# Patient Record
Sex: FEMALE | Race: WHITE | Employment: OTHER | URBAN - METROPOLITAN AREA
[De-identification: names, ages, dates, MRNs, and addresses within clinical notes are randomized per-mention and may not be internally consistent; named-entity substitution may affect disease eponyms.]

---

## 2017-12-11 ENCOUNTER — HOSPITAL ENCOUNTER (OUTPATIENT)
Dept: RADIOLOGY | Facility: MEDICAL CENTER | Age: 72
End: 2017-12-11
Attending: OBSTETRICS & GYNECOLOGY
Payer: MEDICARE

## 2017-12-11 DIAGNOSIS — Z12.31 ENCOUNTER FOR SCREENING MAMMOGRAM FOR MALIGNANT NEOPLASM OF BREAST: ICD-10-CM

## 2017-12-11 PROCEDURE — G0202 SCR MAMMO BI INCL CAD: HCPCS

## 2017-12-20 ENCOUNTER — RESOLUTE PROFESSIONAL BILLING HOSPITAL PROF FEE (OUTPATIENT)
Dept: MEDSURG UNIT | Facility: MEDICAL CENTER | Age: 72
End: 2017-12-20
Payer: MEDICARE

## 2017-12-20 ENCOUNTER — APPOINTMENT (OUTPATIENT)
Dept: RADIOLOGY | Facility: MEDICAL CENTER | Age: 72
DRG: 841 | End: 2017-12-20
Attending: EMERGENCY MEDICINE
Payer: MEDICARE

## 2017-12-20 ENCOUNTER — HOSPITAL ENCOUNTER (INPATIENT)
Facility: MEDICAL CENTER | Age: 72
LOS: 7 days | DRG: 841 | End: 2017-12-27
Attending: EMERGENCY MEDICINE | Admitting: INTERNAL MEDICINE
Payer: MEDICARE

## 2017-12-20 DIAGNOSIS — D69.6 THROMBOCYTOPENIA (HCC): ICD-10-CM

## 2017-12-20 DIAGNOSIS — R06.09 DOE (DYSPNEA ON EXERTION): ICD-10-CM

## 2017-12-20 DIAGNOSIS — D72.820 LYMPHOCYTOSIS: ICD-10-CM

## 2017-12-20 DIAGNOSIS — R00.0 TACHYCARDIA: ICD-10-CM

## 2017-12-20 DIAGNOSIS — R07.89 OTHER CHEST PAIN: ICD-10-CM

## 2017-12-20 DIAGNOSIS — D72.829 LEUKOCYTOSIS, UNSPECIFIED TYPE: ICD-10-CM

## 2017-12-20 DIAGNOSIS — D64.9 ANEMIA, UNSPECIFIED TYPE: ICD-10-CM

## 2017-12-20 LAB
ABO GROUP BLD: NORMAL
ABO GROUP BLD: NORMAL
ALBUMIN SERPL BCP-MCNC: 4.2 G/DL (ref 3.2–4.9)
ALBUMIN/GLOB SERPL: 1.6 G/DL
ALP SERPL-CCNC: 80 U/L (ref 30–99)
ALT SERPL-CCNC: 13 U/L (ref 2–50)
ANION GAP SERPL CALC-SCNC: 10 MMOL/L (ref 0–11.9)
ANISOCYTOSIS BLD QL SMEAR: ABNORMAL
APPEARANCE UR: CLEAR
APTT PPP: 22.6 SEC (ref 24.7–36)
AST SERPL-CCNC: 20 U/L (ref 12–45)
BACTERIA #/AREA URNS HPF: NEGATIVE /HPF
BARCODED ABORH UBTYP: 7300
BARCODED ABORH UBTYP: 7300
BARCODED PRD CODE UBPRD: NORMAL
BARCODED PRD CODE UBPRD: NORMAL
BARCODED UNIT NUM UBUNT: NORMAL
BARCODED UNIT NUM UBUNT: NORMAL
BASOPHILS # BLD AUTO: 0 % (ref 0–1.8)
BASOPHILS # BLD: 0 K/UL (ref 0–0.12)
BILIRUB SERPL-MCNC: 1.1 MG/DL (ref 0.1–1.5)
BILIRUB UR QL STRIP.AUTO: NEGATIVE
BLD GP AB SCN SERPL QL: NORMAL
BNP SERPL-MCNC: 87 PG/ML (ref 0–100)
BUN SERPL-MCNC: 12 MG/DL (ref 8–22)
CALCIUM SERPL-MCNC: 9.3 MG/DL (ref 8.5–10.5)
CHLORIDE SERPL-SCNC: 105 MMOL/L (ref 96–112)
CO2 SERPL-SCNC: 23 MMOL/L (ref 20–33)
COLOR UR: YELLOW
COMPONENT R 8504R: NORMAL
CREAT SERPL-MCNC: 0.7 MG/DL (ref 0.5–1.4)
CULTURE IF INDICATED INDCX: YES UA CULTURE
EOSINOPHIL # BLD AUTO: 0 K/UL (ref 0–0.51)
EOSINOPHIL NFR BLD: 0 % (ref 0–6.9)
EPI CELLS #/AREA URNS HPF: ABNORMAL /HPF
ERYTHROCYTE [DISTWIDTH] IN BLOOD BY AUTOMATED COUNT: 86.6 FL (ref 35.9–50)
FLUAV RNA SPEC QL NAA+PROBE: NEGATIVE
FLUBV RNA SPEC QL NAA+PROBE: NEGATIVE
GFR SERPL CREATININE-BSD FRML MDRD: >60 ML/MIN/1.73 M 2
GLOBULIN SER CALC-MCNC: 2.6 G/DL (ref 1.9–3.5)
GLUCOSE SERPL-MCNC: 101 MG/DL (ref 65–99)
GLUCOSE UR STRIP.AUTO-MCNC: NEGATIVE MG/DL
HCT VFR BLD AUTO: 21.1 % (ref 37–47)
HGB BLD-MCNC: 6.3 G/DL (ref 12–16)
HYALINE CASTS #/AREA URNS LPF: ABNORMAL /LPF
INR PPP: 1.06 (ref 0.87–1.13)
KETONES UR STRIP.AUTO-MCNC: NEGATIVE MG/DL
LACTATE BLD-SCNC: 1.7 MMOL/L (ref 0.5–2)
LEUKOCYTE ESTERASE UR QL STRIP.AUTO: ABNORMAL
LIPASE SERPL-CCNC: 22 U/L (ref 11–82)
LYMPHOCYTES # BLD AUTO: 33.22 K/UL (ref 1–4.8)
LYMPHOCYTES NFR BLD: 73 % (ref 22–41)
MACROCYTES BLD QL SMEAR: ABNORMAL
MANUAL DIFF BLD: NORMAL
MCH RBC QN AUTO: 36.2 PG (ref 27–33)
MCHC RBC AUTO-ENTMCNC: 29.9 G/DL (ref 33.6–35)
MCV RBC AUTO: 121.3 FL (ref 81.4–97.8)
METAMYELOCYTES NFR BLD MANUAL: 0.9 %
MICRO URNS: ABNORMAL
MONOCYTES # BLD AUTO: 1.59 K/UL (ref 0–0.85)
MONOCYTES NFR BLD AUTO: 3.5 % (ref 0–13.4)
MORPHOLOGY BLD-IMP: NORMAL
MYELOCYTES NFR BLD MANUAL: 4.3 %
NEUTROPHILS # BLD AUTO: 7.92 K/UL (ref 2–7.15)
NEUTROPHILS NFR BLD: 13 % (ref 44–72)
NEUTS BAND NFR BLD MANUAL: 4.4 % (ref 0–10)
NITRITE UR QL STRIP.AUTO: NEGATIVE
NRBC # BLD AUTO: 0.36 K/UL
NRBC BLD-RTO: 0.8 /100 WBC
PH UR STRIP.AUTO: 5.5 [PH]
PLATELET # BLD AUTO: 22 K/UL (ref 164–446)
PLATELET BLD QL SMEAR: NORMAL
PLATELETS.RETICULATED NFR BLD AUTO: 12.1 K/UL (ref 0.6–13.1)
PMV BLD AUTO: 11.1 FL (ref 9–12.9)
POIKILOCYTOSIS BLD QL SMEAR: NORMAL
POTASSIUM SERPL-SCNC: 4.1 MMOL/L (ref 3.6–5.5)
PRODUCT TYPE UPROD: NORMAL
PRODUCT TYPE UPROD: NORMAL
PROMYELOCYTES NFR BLD MANUAL: 0.9 %
PROT SERPL-MCNC: 6.8 G/DL (ref 6–8.2)
PROT UR QL STRIP: NEGATIVE MG/DL
PROTHROMBIN TIME: 13.5 SEC (ref 12–14.6)
RBC # BLD AUTO: 1.74 M/UL (ref 4.2–5.4)
RBC # URNS HPF: ABNORMAL /HPF
RBC BLD AUTO: PRESENT
RBC UR QL AUTO: NEGATIVE
RH BLD: NORMAL
SODIUM SERPL-SCNC: 138 MMOL/L (ref 135–145)
SP GR UR STRIP.AUTO: 1.01
STOMATOCYTES BLD QL SMEAR: NORMAL
TROPONIN I SERPL-MCNC: <0.01 NG/ML (ref 0–0.04)
UNIT STATUS USTAT: NORMAL
UNIT STATUS USTAT: NORMAL
UROBILINOGEN UR STRIP.AUTO-MCNC: 0.2 MG/DL
VARIANT LYMPHS BLD QL SMEAR: NORMAL
WBC # BLD AUTO: 45.5 K/UL (ref 4.8–10.8)
WBC #/AREA URNS HPF: ABNORMAL /HPF

## 2017-12-20 PROCEDURE — 770020 HCHG ROOM/CARE - TELE (206)

## 2017-12-20 PROCEDURE — 85730 THROMBOPLASTIN TIME PARTIAL: CPT

## 2017-12-20 PROCEDURE — 86923 COMPATIBILITY TEST ELECTRIC: CPT

## 2017-12-20 PROCEDURE — 85007 BL SMEAR W/DIFF WBC COUNT: CPT

## 2017-12-20 PROCEDURE — P9016 RBC LEUKOCYTES REDUCED: HCPCS

## 2017-12-20 PROCEDURE — 99285 EMERGENCY DEPT VISIT HI MDM: CPT

## 2017-12-20 PROCEDURE — 700105 HCHG RX REV CODE 258: Performed by: STUDENT IN AN ORGANIZED HEALTH CARE EDUCATION/TRAINING PROGRAM

## 2017-12-20 PROCEDURE — 87040 BLOOD CULTURE FOR BACTERIA: CPT

## 2017-12-20 PROCEDURE — 87502 INFLUENZA DNA AMP PROBE: CPT

## 2017-12-20 PROCEDURE — 36430 TRANSFUSION BLD/BLD COMPNT: CPT

## 2017-12-20 PROCEDURE — 93005 ELECTROCARDIOGRAM TRACING: CPT | Performed by: EMERGENCY MEDICINE

## 2017-12-20 PROCEDURE — 93005 ELECTROCARDIOGRAM TRACING: CPT

## 2017-12-20 PROCEDURE — 83690 ASSAY OF LIPASE: CPT

## 2017-12-20 PROCEDURE — 71010 DX-CHEST-LIMITED (1 VIEW): CPT

## 2017-12-20 PROCEDURE — 86900 BLOOD TYPING SEROLOGIC ABO: CPT

## 2017-12-20 PROCEDURE — 87086 URINE CULTURE/COLONY COUNT: CPT

## 2017-12-20 PROCEDURE — 83880 ASSAY OF NATRIURETIC PEPTIDE: CPT

## 2017-12-20 PROCEDURE — 700105 HCHG RX REV CODE 258: Performed by: EMERGENCY MEDICINE

## 2017-12-20 PROCEDURE — 81001 URINALYSIS AUTO W/SCOPE: CPT

## 2017-12-20 PROCEDURE — 80053 COMPREHEN METABOLIC PANEL: CPT

## 2017-12-20 PROCEDURE — 85027 COMPLETE CBC AUTOMATED: CPT

## 2017-12-20 PROCEDURE — 83605 ASSAY OF LACTIC ACID: CPT

## 2017-12-20 PROCEDURE — 84484 ASSAY OF TROPONIN QUANT: CPT

## 2017-12-20 PROCEDURE — 85610 PROTHROMBIN TIME: CPT

## 2017-12-20 PROCEDURE — 85055 RETICULATED PLATELET ASSAY: CPT

## 2017-12-20 PROCEDURE — 86901 BLOOD TYPING SEROLOGIC RH(D): CPT

## 2017-12-20 PROCEDURE — 30233N1 TRANSFUSION OF NONAUTOLOGOUS RED BLOOD CELLS INTO PERIPHERAL VEIN, PERCUTANEOUS APPROACH: ICD-10-PCS | Performed by: EMERGENCY MEDICINE

## 2017-12-20 PROCEDURE — 86850 RBC ANTIBODY SCREEN: CPT

## 2017-12-20 RX ORDER — ACETAMINOPHEN 325 MG/1
650 TABLET ORAL EVERY 6 HOURS PRN
Status: DISCONTINUED | OUTPATIENT
Start: 2017-12-20 | End: 2017-12-27 | Stop reason: HOSPADM

## 2017-12-20 RX ORDER — GUAIFENESIN/DEXTROMETHORPHAN 100-10MG/5
10 SYRUP ORAL EVERY 6 HOURS PRN
Status: DISCONTINUED | OUTPATIENT
Start: 2017-12-20 | End: 2017-12-27 | Stop reason: HOSPADM

## 2017-12-20 RX ORDER — SODIUM CHLORIDE 9 MG/ML
INJECTION, SOLUTION INTRAVENOUS CONTINUOUS
Status: DISCONTINUED | OUTPATIENT
Start: 2017-12-20 | End: 2017-12-21

## 2017-12-20 RX ORDER — POLYETHYLENE GLYCOL 3350 17 G/17G
1 POWDER, FOR SOLUTION ORAL
Status: DISCONTINUED | OUTPATIENT
Start: 2017-12-20 | End: 2017-12-27 | Stop reason: HOSPADM

## 2017-12-20 RX ORDER — SODIUM CHLORIDE 9 MG/ML
INJECTION, SOLUTION INTRAVENOUS CONTINUOUS
Status: DISCONTINUED | OUTPATIENT
Start: 2017-12-20 | End: 2017-12-22

## 2017-12-20 RX ORDER — BISACODYL 10 MG
10 SUPPOSITORY, RECTAL RECTAL
Status: DISCONTINUED | OUTPATIENT
Start: 2017-12-20 | End: 2017-12-27 | Stop reason: HOSPADM

## 2017-12-20 RX ORDER — AMOXICILLIN 250 MG
2 CAPSULE ORAL 2 TIMES DAILY
Status: DISCONTINUED | OUTPATIENT
Start: 2017-12-20 | End: 2017-12-27 | Stop reason: HOSPADM

## 2017-12-20 RX ADMIN — SODIUM CHLORIDE 1000 ML: 9 INJECTION, SOLUTION INTRAVENOUS at 21:37

## 2017-12-20 RX ADMIN — SODIUM CHLORIDE: 9 INJECTION, SOLUTION INTRAVENOUS at 23:09

## 2017-12-20 ASSESSMENT — COPD QUESTIONNAIRES
DURING THE PAST 4 WEEKS HOW MUCH DID YOU FEEL SHORT OF BREATH: SOME OF THE TIME
HAVE YOU SMOKED AT LEAST 100 CIGARETTES IN YOUR ENTIRE LIFE: NO/DON'T KNOW
COPD SCREENING SCORE: 5
DO YOU EVER COUGH UP ANY MUCUS OR PHLEGM?: YES, EVERY DAY

## 2017-12-20 ASSESSMENT — ENCOUNTER SYMPTOMS
FALLS: 0
FLANK PAIN: 0
BLURRED VISION: 0
WEAKNESS: 1
VOMITING: 0
SHORTNESS OF BREATH: 1
ORTHOPNEA: 0
CONSTIPATION: 0
COUGH: 1
PHOTOPHOBIA: 0
FEVER: 0
NAUSEA: 0
DEPRESSION: 0
DOUBLE VISION: 0
DIZZINESS: 0
DIARRHEA: 0
SORE THROAT: 0
BLOOD IN STOOL: 0
PND: 0
SEIZURES: 0
HEMOPTYSIS: 0
CHILLS: 0
ABDOMINAL PAIN: 0
HEADACHES: 0
LOSS OF CONSCIOUSNESS: 0

## 2017-12-20 ASSESSMENT — PAIN SCALES - GENERAL
PAINLEVEL_OUTOF10: 0

## 2017-12-20 ASSESSMENT — LIFESTYLE VARIABLES
EVER_SMOKED: NEVER
EVER_SMOKED: NEVER
DO YOU DRINK ALCOHOL: NO
ALCOHOL_USE: NO

## 2017-12-20 ASSESSMENT — PATIENT HEALTH QUESTIONNAIRE - PHQ9
SUM OF ALL RESPONSES TO PHQ QUESTIONS 1-9: 0
2. FEELING DOWN, DEPRESSED, IRRITABLE, OR HOPELESS: NOT AT ALL
1. LITTLE INTEREST OR PLEASURE IN DOING THINGS: NOT AT ALL
SUM OF ALL RESPONSES TO PHQ9 QUESTIONS 1 AND 2: 0

## 2017-12-20 NOTE — ED NOTES
.  Chief Complaint   Patient presents with   • Sent by MD     cardiology   • Chest Pain     chest tightness 1 month, constant rapid hr   • Shortness of Breath     with exertion   pt ambulated to triage with s/o. Sent by pcp. Pt reports dx with unstable angina. ekg complete prior to triage.   Pt sob and rapid HR with exertion.

## 2017-12-21 ENCOUNTER — APPOINTMENT (OUTPATIENT)
Dept: RADIOLOGY | Facility: MEDICAL CENTER | Age: 72
DRG: 841 | End: 2017-12-21
Attending: STUDENT IN AN ORGANIZED HEALTH CARE EDUCATION/TRAINING PROGRAM
Payer: MEDICARE

## 2017-12-21 LAB
ALBUMIN SERPL BCP-MCNC: 3.4 G/DL (ref 3.2–4.9)
ALBUMIN/GLOB SERPL: 1.7 G/DL
ALP SERPL-CCNC: 62 U/L (ref 30–99)
ALT SERPL-CCNC: 10 U/L (ref 2–50)
ANION GAP SERPL CALC-SCNC: 7 MMOL/L (ref 0–11.9)
ANISOCYTOSIS BLD QL SMEAR: ABNORMAL
ANISOCYTOSIS BLD QL SMEAR: ABNORMAL
AST SERPL-CCNC: 16 U/L (ref 12–45)
BASOPHILS # BLD AUTO: 0 % (ref 0–1.8)
BASOPHILS # BLD AUTO: 0 % (ref 0–1.8)
BASOPHILS # BLD: 0 K/UL (ref 0–0.12)
BASOPHILS # BLD: 0 K/UL (ref 0–0.12)
BILIRUB SERPL-MCNC: 1.8 MG/DL (ref 0.1–1.5)
BUN SERPL-MCNC: 11 MG/DL (ref 8–22)
CALCIUM SERPL-MCNC: 8.8 MG/DL (ref 8.5–10.5)
CHLORIDE SERPL-SCNC: 108 MMOL/L (ref 96–112)
CO2 SERPL-SCNC: 24 MMOL/L (ref 20–33)
CREAT SERPL-MCNC: 0.62 MG/DL (ref 0.5–1.4)
DAT C3D-SP REAG RBC QL: NORMAL
DAT IGG-SP REAG RBC QL: NORMAL
EKG IMPRESSION: NORMAL
EOSINOPHIL # BLD AUTO: 0 K/UL (ref 0–0.51)
EOSINOPHIL # BLD AUTO: 0.29 K/UL (ref 0–0.51)
EOSINOPHIL NFR BLD: 0 % (ref 0–6.9)
EOSINOPHIL NFR BLD: 0.9 % (ref 0–6.9)
ERYTHROCYTE [DISTWIDTH] IN BLOOD BY AUTOMATED COUNT: 84.1 FL (ref 35.9–50)
ERYTHROCYTE [DISTWIDTH] IN BLOOD BY AUTOMATED COUNT: 96.5 FL (ref 35.9–50)
ERYTHROCYTE [SEDIMENTATION RATE] IN BLOOD BY WESTERGREN METHOD: 77 MM/HOUR (ref 0–30)
GFR SERPL CREATININE-BSD FRML MDRD: >60 ML/MIN/1.73 M 2
GLOBULIN SER CALC-MCNC: 2 G/DL (ref 1.9–3.5)
GLUCOSE SERPL-MCNC: 94 MG/DL (ref 65–99)
HAV IGM SERPL QL IA: NEGATIVE
HBV CORE IGM SER QL: NEGATIVE
HBV SURFACE AG SER QL: NEGATIVE
HCT VFR BLD AUTO: 20.4 % (ref 37–47)
HCT VFR BLD AUTO: 26.3 % (ref 37–47)
HCV AB SER QL: NEGATIVE
HGB BLD-MCNC: 6.2 G/DL (ref 12–16)
HGB BLD-MCNC: 8.4 G/DL (ref 12–16)
HGB RETIC QN AUTO: 34.2 PG/CELL (ref 29–35)
HIV 1+2 AB+HIV1 P24 AG SERPL QL IA: NON REACTIVE
IMM RETICS NFR: 38.7 % (ref 9.3–17.4)
LDH SERPL L TO P-CCNC: 220 U/L (ref 107–266)
LDH SERPL L TO P-CCNC: 243 U/L (ref 107–266)
LYMPHOCYTES # BLD AUTO: 23.62 K/UL (ref 1–4.8)
LYMPHOCYTES # BLD AUTO: 25.82 K/UL (ref 1–4.8)
LYMPHOCYTES NFR BLD: 78.2 % (ref 22–41)
LYMPHOCYTES NFR BLD: 81.2 % (ref 22–41)
MACROCYTES BLD QL SMEAR: ABNORMAL
MACROCYTES BLD QL SMEAR: ABNORMAL
MANUAL DIFF BLD: NORMAL
MANUAL DIFF BLD: NORMAL
MCH RBC QN AUTO: 34.7 PG (ref 27–33)
MCH RBC QN AUTO: 34.8 PG (ref 27–33)
MCHC RBC AUTO-ENTMCNC: 30.4 G/DL (ref 33.6–35)
MCHC RBC AUTO-ENTMCNC: 31.9 G/DL (ref 33.6–35)
MCV RBC AUTO: 108.7 FL (ref 81.4–97.8)
MCV RBC AUTO: 114.6 FL (ref 81.4–97.8)
METAMYELOCYTES NFR BLD MANUAL: 1.7 %
MONOCYTES # BLD AUTO: 0.57 K/UL (ref 0–0.85)
MONOCYTES # BLD AUTO: 1.06 K/UL (ref 0–0.85)
MONOCYTES NFR BLD AUTO: 1.8 % (ref 0–13.4)
MONOCYTES NFR BLD AUTO: 3.5 % (ref 0–13.4)
MORPHOLOGY BLD-IMP: NORMAL
MORPHOLOGY BLD-IMP: NORMAL
MYELOCYTES NFR BLD MANUAL: 0.9 %
NEUTROPHILS # BLD AUTO: 4.74 K/UL (ref 2–7.15)
NEUTROPHILS # BLD AUTO: 5.12 K/UL (ref 2–7.15)
NEUTROPHILS NFR BLD: 12.2 % (ref 44–72)
NEUTROPHILS NFR BLD: 15.2 % (ref 44–72)
NEUTS BAND NFR BLD MANUAL: 0.9 % (ref 0–10)
NEUTS BAND NFR BLD MANUAL: 3.5 % (ref 0–10)
NRBC # BLD AUTO: 0.27 K/UL
NRBC # BLD AUTO: 0.4 K/UL
NRBC BLD-RTO: 0.9 /100 WBC
NRBC BLD-RTO: 1.3 /100 WBC
PLATELET # BLD AUTO: 17 K/UL (ref 164–446)
PLATELET # BLD AUTO: 18 K/UL (ref 164–446)
PLATELET BLD QL SMEAR: NORMAL
PLATELETS.RETICULATED NFR BLD AUTO: 10.1 K/UL (ref 0.6–13.1)
PLATELETS.RETICULATED NFR BLD AUTO: 11.3 K/UL (ref 0.6–13.1)
PMV BLD AUTO: 11 FL (ref 9–12.9)
PMV BLD AUTO: 12 FL (ref 9–12.9)
POLYCHROMASIA BLD QL SMEAR: NORMAL
POLYCHROMASIA BLD QL SMEAR: NORMAL
POTASSIUM SERPL-SCNC: 3.7 MMOL/L (ref 3.6–5.5)
PROT SERPL-MCNC: 5.4 G/DL (ref 6–8.2)
QORDR QORDR: NORMAL
RBC # BLD AUTO: 1.78 M/UL (ref 4.2–5.4)
RBC # BLD AUTO: 2.42 M/UL (ref 4.2–5.4)
RBC BLD AUTO: PRESENT
RBC BLD AUTO: PRESENT
RETICS # AUTO: 0.13 M/UL (ref 0.04–0.06)
RETICS/RBC NFR: 5.5 % (ref 0.8–2.1)
SMUDGE CELLS BLD QL SMEAR: NORMAL
SODIUM SERPL-SCNC: 139 MMOL/L (ref 135–145)
URATE SERPL-MCNC: 5 MG/DL (ref 1.9–8.2)
URATE SERPL-MCNC: 5.8 MG/DL (ref 1.9–8.2)
WBC # BLD AUTO: 30.2 K/UL (ref 4.8–10.8)
WBC # BLD AUTO: 31.8 K/UL (ref 4.8–10.8)

## 2017-12-21 PROCEDURE — P9016 RBC LEUKOCYTES REDUCED: HCPCS

## 2017-12-21 PROCEDURE — 86663 EPSTEIN-BARR ANTIBODY: CPT

## 2017-12-21 PROCEDURE — 700105 HCHG RX REV CODE 258: Performed by: STUDENT IN AN ORGANIZED HEALTH CARE EDUCATION/TRAINING PROGRAM

## 2017-12-21 PROCEDURE — G0475 HIV COMBINATION ASSAY: HCPCS

## 2017-12-21 PROCEDURE — 80074 ACUTE HEPATITIS PANEL: CPT

## 2017-12-21 PROCEDURE — 36430 TRANSFUSION BLD/BLD COMPNT: CPT

## 2017-12-21 PROCEDURE — 83615 LACTATE (LD) (LDH) ENZYME: CPT | Mod: 91

## 2017-12-21 PROCEDURE — 770020 HCHG ROOM/CARE - TELE (206)

## 2017-12-21 PROCEDURE — 85652 RBC SED RATE AUTOMATED: CPT

## 2017-12-21 PROCEDURE — 86664 EPSTEIN-BARR NUCLEAR ANTIGEN: CPT

## 2017-12-21 PROCEDURE — A9270 NON-COVERED ITEM OR SERVICE: HCPCS | Performed by: STUDENT IN AN ORGANIZED HEALTH CARE EDUCATION/TRAINING PROGRAM

## 2017-12-21 PROCEDURE — 71020 DX-CHEST-2 VIEWS: CPT

## 2017-12-21 PROCEDURE — 99223 1ST HOSP IP/OBS HIGH 75: CPT | Mod: AI,GC | Performed by: INTERNAL MEDICINE

## 2017-12-21 PROCEDURE — 86923 COMPATIBILITY TEST ELECTRIC: CPT

## 2017-12-21 PROCEDURE — 700102 HCHG RX REV CODE 250 W/ 637 OVERRIDE(OP): Performed by: STUDENT IN AN ORGANIZED HEALTH CARE EDUCATION/TRAINING PROGRAM

## 2017-12-21 PROCEDURE — 700105 HCHG RX REV CODE 258

## 2017-12-21 PROCEDURE — 86880 COOMBS TEST DIRECT: CPT

## 2017-12-21 PROCEDURE — 86665 EPSTEIN-BARR CAPSID VCA: CPT

## 2017-12-21 PROCEDURE — 85007 BL SMEAR W/DIFF WBC COUNT: CPT

## 2017-12-21 PROCEDURE — 85046 RETICYTE/HGB CONCENTRATE: CPT

## 2017-12-21 PROCEDURE — 700105 HCHG RX REV CODE 258: Performed by: EMERGENCY MEDICINE

## 2017-12-21 PROCEDURE — 86645 CMV ANTIBODY IGM: CPT

## 2017-12-21 PROCEDURE — 85055 RETICULATED PLATELET ASSAY: CPT

## 2017-12-21 PROCEDURE — 84550 ASSAY OF BLOOD/URIC ACID: CPT

## 2017-12-21 PROCEDURE — 83010 ASSAY OF HAPTOGLOBIN QUANT: CPT

## 2017-12-21 PROCEDURE — 86644 CMV ANTIBODY: CPT

## 2017-12-21 PROCEDURE — 85027 COMPLETE CBC AUTOMATED: CPT

## 2017-12-21 PROCEDURE — 80053 COMPREHEN METABOLIC PANEL: CPT

## 2017-12-21 PROCEDURE — 36415 COLL VENOUS BLD VENIPUNCTURE: CPT

## 2017-12-21 RX ORDER — POTASSIUM CHLORIDE 20 MEQ/1
20 TABLET, EXTENDED RELEASE ORAL ONCE
Status: COMPLETED | OUTPATIENT
Start: 2017-12-21 | End: 2017-12-21

## 2017-12-21 RX ORDER — SODIUM CHLORIDE 9 MG/ML
INJECTION, SOLUTION INTRAVENOUS
Status: COMPLETED
Start: 2017-12-21 | End: 2017-12-21

## 2017-12-21 RX ORDER — POTASSIUM CHLORIDE 20 MEQ/1
30 TABLET, EXTENDED RELEASE ORAL ONCE
Status: COMPLETED | OUTPATIENT
Start: 2017-12-21 | End: 2017-12-21

## 2017-12-21 RX ORDER — POTASSIUM CHLORIDE 20 MEQ/1
10 TABLET, EXTENDED RELEASE ORAL ONCE
Status: DISCONTINUED | OUTPATIENT
Start: 2017-12-21 | End: 2017-12-21

## 2017-12-21 RX ADMIN — SODIUM CHLORIDE: 9 INJECTION, SOLUTION INTRAVENOUS at 09:00

## 2017-12-21 RX ADMIN — SODIUM CHLORIDE: 9 INJECTION, SOLUTION INTRAVENOUS at 17:55

## 2017-12-21 RX ADMIN — POTASSIUM CHLORIDE 10 MEQ: 1500 TABLET, EXTENDED RELEASE ORAL at 04:46

## 2017-12-21 RX ADMIN — POTASSIUM CHLORIDE 20 MEQ: 1500 TABLET, EXTENDED RELEASE ORAL at 05:14

## 2017-12-21 ASSESSMENT — ENCOUNTER SYMPTOMS
DEPRESSION: 0
BACK PAIN: 0
DOUBLE VISION: 0
DIARRHEA: 0
NERVOUS/ANXIOUS: 0
CHILLS: 0
HEARTBURN: 0
FEVER: 0
PHOTOPHOBIA: 0
HALLUCINATIONS: 0
SORE THROAT: 0
DIAPHORESIS: 0
SINUS PAIN: 0
HEMOPTYSIS: 0
BLURRED VISION: 0
VOMITING: 0
STRIDOR: 0
NECK PAIN: 0
SPEECH CHANGE: 0
DIZZINESS: 0
BRUISES/BLEEDS EASILY: 0
NAUSEA: 0
CONSTIPATION: 0
WEIGHT LOSS: 1
SHORTNESS OF BREATH: 1
SPUTUM PRODUCTION: 1
ABDOMINAL PAIN: 0
FOCAL WEAKNESS: 0
MYALGIAS: 0
TREMORS: 0
COUGH: 1
SENSORY CHANGE: 0
PALPITATIONS: 1

## 2017-12-21 ASSESSMENT — PAIN SCALES - GENERAL
PAINLEVEL_OUTOF10: 0

## 2017-12-21 ASSESSMENT — LIFESTYLE VARIABLES: SUBSTANCE_ABUSE: 0

## 2017-12-21 NOTE — PROGRESS NOTES
Second PIV placed. Clark Martinez M.D. At bedside. Planning for bone marrow biopsy tomorrow at 12:00pm.   NPO midnight for biopsy.

## 2017-12-21 NOTE — ASSESSMENT & PLAN NOTE
Leukocytosis with lymphocytic predominance  Myelocytes and metamyelocytes in the differential  likely 2/2 lymphoproliferative disorder vs CLL.  No signs of active infection   Blood cultures- NGTD  Abdominal ultrasound showed hepatosplenomegaly, however no lymphadenopathy on physical exam.   Plan:  Pt has orders for daily CBCs    To be f/u by Dr. Martinez

## 2017-12-21 NOTE — ED NOTES
Magaly from Lab called with critical result of wbc 45.5, platelets 22 at 1831. Critical lab result read back to Magaly.   Dr. Herrera notified of critical lab result at 1835.  Critical lab result read back by Dr. Mcgraw.

## 2017-12-21 NOTE — PROGRESS NOTES
Lab called again, CBC will be added from the blood taken at 3 am and next blood taken with the schedule at 8 am.

## 2017-12-21 NOTE — PROGRESS NOTES
Received call from Kaci in lab reporting WBC 30.2 and Platelet count of 17. Page sent to [facility] orange team at this time.     Kaci from Lab called with critical result of WBC 30.2 and platelet 17 at 0815. Critical lab result read back to Kaci.   Dr. Arizmendi notified of critical lab result at 0830.  Critical lab result read back by Dr. Goodrich.

## 2017-12-21 NOTE — PROGRESS NOTES
Pt transferred to the floor from ED. Assessment performed, VS taken. Pain assessed.   Monitor box on the pt, monitor room notified. Personal belongings with the pt.  Bed in low position bed alarm on, call light within the reach. Pt oriented to the room. All questions answered.

## 2017-12-21 NOTE — ASSESSMENT & PLAN NOTE
Pt presented with CARMONA and fatigue since 1 month  Had WBC of 33.7, Hb of 6.3 and low PC on admission  Macrocytic anemia- normal Vitamin B12, folate and TSH.  Iron studies wnl except for low iron  Retic index indicated adequate reticulocyte response.   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   LDH, haptoglobin wnl  hb 6/7-->9.3-->7.9-->7.7-->7.9  Heme/onc on board. Bone marrow biopsy was done on 12/22/17- pending results  Not resolving anemia likely due to an underlying bone marrow infiltrative disorder  Plan:  Continue prednisone 60 MG on discharge  Pt to get daily CBCs at [facility] with instructions to call Dr. Martinez with results and for transfusion orders.  Pt has an appointment with Dr. Martinez on 1/8/18 to discuss biopsy results and f/u

## 2017-12-21 NOTE — PROGRESS NOTES
CMP results present but CBC missing, lab called.  Results should be  Added  shortly. Order for CBC q 6 modified. Next lab draw with the schedule at 8 am.

## 2017-12-21 NOTE — H&P
Internal Medicine Admitting History and Physical    Note Author: Lucinda Garduno M.D.       Name Puja Briscoe     1945   Age/Sex 72 y.o. female   MRN [id number]   Code Status Full Code     After 5PM or if no immediate response to page, please call for cross-coverage  Attending/Team: Dr. Arenas/Castro See Patient List for primary contact information  Call [phone] to page    1st Call - Day Intern (R1):   Dr. Arizmendi 2nd Call - Day Sr. Resident (R2/R3):   Dr. Antonio       Chief Complaint:  Shortness of breath and fatigue     HPI:  72 year old female presented to ED with complaints of shortness of breath and fatigue. Over the last 6-8 weeks, she has developed progressive shortness of breath, worse with exertion. She was able to walk her dogs, which she can no longer do. She gets short of breath walking to her mail box. SOB is associated with chest tightness which is relieved once her SOB improves. She also reports she has been feeling fatigued during this time, more so over the last month. For almost 2 weeks she was in bed as she had no energy. H/o decreased appetite and food intake.     After arriving to ED, labs showed her to have leukocytosis of 45, Hb 6.3 and platelets 22. FOBT performed by ED physician was negative. She was given 1 unit of blood. She is currently not on any medication. Denies any recent travel or sick contacts.       Review of Systems   Constitutional: Positive for malaise/fatigue. Negative for chills and fever.        Feels cold all the time   HENT: Negative for congestion, ear discharge and sore throat.    Eyes: Negative for blurred vision, double vision and photophobia.   Respiratory: Positive for cough and shortness of breath. Negative for hemoptysis.         Cough for 1 year, has been getting progressively worse. Productive greenish in AM, clear in afternoon/night   Cardiovascular: Negative for chest pain, orthopnea, leg swelling and PND.   Gastrointestinal: Negative for  abdominal pain, blood in stool, constipation, diarrhea, melena, nausea and vomiting.   Genitourinary: Negative for dysuria, flank pain, frequency, hematuria and urgency.   Musculoskeletal: Negative for falls and joint pain.   Skin: Negative for itching and rash.   Neurological: Positive for weakness. Negative for dizziness, seizures, loss of consciousness and headaches.   Endo/Heme/Allergies:        Thinning of hair   Psychiatric/Behavioral: Negative for depression and suicidal ideas.             Past Medical History:   Past Medical History:   Diagnosis Date   • Hx of biopsy 1997    right breast       Past Surgical History:  Past Surgical History:   Procedure Laterality Date   • HYSTERECTOMY LAPAROSCOPY     • KNEE ORIF      left   • US-NEEDLE CORE BX-BREAST PANEL         Current Outpatient Medications:  Home Medications     Reviewed by rTey Thomas (Pharmacy Tech) on 17 at 2100  Med List Status: Complete   Medication Last Dose Status        Patient Harjit Taking any Medications                       Medication Allergy/Sensitivities:  No Known Allergies      Family History:  Brother: Amyloidosis and Brain tumor; Brother's son  of Non-Hodgkin Lymphoma  DM in family    Social History:  Social History     Social History   • Marital status: Single     Spouse name: N/A   • Number of children: N/A   • Years of education: N/A     Occupational History   • Not on file.     Social History Main Topics   • Smoking status: Never Smoker   • Smokeless tobacco: Not on file   • Alcohol use Yes      Comment: socially   • Drug use: Unknown   • Sexual activity: Not on file     Other Topics Concern   • Not on file     Social History Narrative   • No narrative on file     Living situation: Lives with her   PCP: Elan Ashraf MD      Physical Exam     Vitals:    17 2157 17 2200 17 2230 17 2310   BP:    137/62   Pulse:  (!) 106  (!) 107   Resp:  19 (!) 28 20   Temp: 37.2 °C (99 °F)   37.2 °C  "(99 °F)   TempSrc:       SpO2:  96% 99% 98%   Weight:    58.6 kg (129 lb 3 oz)   Height:         Body mass index is 21.5 kg/m².  /62   Pulse (!) 107   Temp 37.2 °C (99 °F)   Resp 20   Ht 1.651 m (5' 5\")   Wt 58.6 kg (129 lb 3 oz)   SpO2 98%   BMI 21.50 kg/m²   O2 therapy: Pulse Oximetry: 98 %, O2 Delivery: None (Room Air)    Physical Exam   Constitutional: She is oriented to person, place, and time and well-developed, well-nourished, and in no distress. No distress.   HENT:   Head: Normocephalic and atraumatic.   Mouth/Throat: No oropharyngeal exudate.   Eyes: Conjunctivae and EOM are normal. Pupils are equal, round, and reactive to light. No scleral icterus.   Neck: Neck supple.   Cardiovascular: Regular rhythm.    tachycardic   Pulmonary/Chest: Effort normal and breath sounds normal. No respiratory distress. She has no wheezes. She has no rales.   Abdominal: Soft. Bowel sounds are normal. She exhibits no distension. There is no tenderness. There is no rebound and no guarding.   Musculoskeletal: She exhibits no edema or tenderness.   Lymphadenopathy:     She has no cervical adenopathy.   Neurological: She is alert and oriented to person, place, and time. No cranial nerve deficit.   Motor strength 5/5 in upper and lower extremity   Skin: No rash noted.   Psychiatric: Affect and judgment normal.   Nursing note and vitals reviewed.            Data Review       Old Records Request:   Completed  Current Records review and summary: Completed    Lab Data Review:  Recent Results (from the past 24 hour(s))   EKG (NOW)    Collection Time: 17  2:36 PM   Result Value Ref Range    Report       [facility]    Test Date:  2017  Pt Name:    JUDAH BORJAS                  Department: ER  MRN:        [id number]                      Room:  Gender:     Female                       Technician: [id number]  :        1945                   Requested By:ER TRIAGE PROTOCOL  Order #: "    [id number]                    Reading MD:    Measurements  Intervals                                Axis  Rate:       109                          P:          82  OH:         192                          QRS:        12  QRSD:       82                           T:          41  QT:         332  QTc:        448    Interpretive Statements  SINUS TACHYCARDIA  PROBABLE LEFT ATRIAL ABNORMALITY  Compared to ECG 10/15/2011 14:33:46  No significant changes     Troponin    Collection Time: 12/20/17  5:22 PM   Result Value Ref Range    Troponin I <0.01 0.00 - 0.04 ng/mL   Btype Natriuretic Peptide    Collection Time: 12/20/17  5:22 PM   Result Value Ref Range    B Natriuretic Peptide 87 0 - 100 pg/mL   CBC with Differential    Collection Time: 12/20/17  5:22 PM   Result Value Ref Range    WBC 45.5 (HH) 4.8 - 10.8 K/uL    RBC 1.74 (L) 4.20 - 5.40 M/uL    Hemoglobin 6.3 (L) 12.0 - 16.0 g/dL    Hematocrit 21.1 (L) 37.0 - 47.0 %    .3 (H) 81.4 - 97.8 fL    MCH 36.2 (H) 27.0 - 33.0 pg    MCHC 29.9 (L) 33.6 - 35.0 g/dL    RDW 86.6 (H) 35.9 - 50.0 fL    Platelet Count 22 (LL) 164 - 446 K/uL    MPV 11.1 9.0 - 12.9 fL    Nucleated RBC 0.80 /100 WBC    NRBC (Absolute) 0.36 K/uL    Neutrophils-Polys 13.00 (L) 44.00 - 72.00 %    Lymphocytes 73.00 (H) 22.00 - 41.00 %    Monocytes 3.50 0.00 - 13.40 %    Eosinophils 0.00 0.00 - 6.90 %    Basophils 0.00 0.00 - 1.80 %    Neutrophils (Absolute) 7.92 (H) 2.00 - 7.15 K/uL    Lymphs (Absolute) 33.22 (H) 1.00 - 4.80 K/uL    Monos (Absolute) 1.59 (H) 0.00 - 0.85 K/uL    Eos (Absolute) 0.00 0.00 - 0.51 K/uL    Baso (Absolute) 0.00 0.00 - 0.12 K/uL    Anisocytosis 1+     Macrocytosis 1+    Complete Metabolic Panel (CMP)    Collection Time: 12/20/17  5:22 PM   Result Value Ref Range    Sodium 138 135 - 145 mmol/L    Potassium 4.1 3.6 - 5.5 mmol/L    Chloride 105 96 - 112 mmol/L    Co2 23 20 - 33 mmol/L    Anion Gap 10.0 0.0 - 11.9    Glucose 101 (H) 65 - 99 mg/dL    Bun 12 8 - 22 mg/dL     Creatinine 0.70 0.50 - 1.40 mg/dL    Calcium 9.3 8.5 - 10.5 mg/dL    AST(SGOT) 20 12 - 45 U/L    ALT(SGPT) 13 2 - 50 U/L    Alkaline Phosphatase 80 30 - 99 U/L    Total Bilirubin 1.1 0.1 - 1.5 mg/dL    Albumin 4.2 3.2 - 4.9 g/dL    Total Protein 6.8 6.0 - 8.2 g/dL    Globulin 2.6 1.9 - 3.5 g/dL    A-G Ratio 1.6 g/dL   Prothrombin Time    Collection Time: 12/20/17  5:22 PM   Result Value Ref Range    PT 13.5 12.0 - 14.6 sec    INR 1.06 0.87 - 1.13   APTT    Collection Time: 12/20/17  5:22 PM   Result Value Ref Range    APTT 22.6 (L) 24.7 - 36.0 sec   Lipase    Collection Time: 12/20/17  5:22 PM   Result Value Ref Range    Lipase 22 11 - 82 U/L   ESTIMATED GFR    Collection Time: 12/20/17  5:22 PM   Result Value Ref Range    GFR If African American >60 >60 mL/min/1.73 m 2    GFR If Non African American >60 >60 mL/min/1.73 m 2   COD (ADULT)    Collection Time: 12/20/17  5:22 PM   Result Value Ref Range    ABO Grouping Only B     Rh Grouping Only POS     Antibody Screen-Cod NEG     Component R       R3                  Red Blood Cells3    [id number]   issued       12/20/17   21:22      Product Type Red Blood Cells LR Pheresis     Dispense Status Issued     Unit Number (Barcoded) [id number]     Product Code (Barcoded) X0034T50     Blood Type (Barcoded) 7300    DIFFERENTIAL MANUAL    Collection Time: 12/20/17  5:22 PM   Result Value Ref Range    Bands-Stabs 4.40 0.00 - 10.00 %    Metamyelocytes 0.90 %    Myelocytes 4.30 %    Progranulocytes 0.90 %    Manual Diff Status PERFORMED    PERIPHERAL SMEAR REVIEW    Collection Time: 12/20/17  5:22 PM   Result Value Ref Range    Peripheral Smear Review see below    PLATELET ESTIMATE    Collection Time: 12/20/17  5:22 PM   Result Value Ref Range    Plt Estimation Marked Decrease    MORPHOLOGY    Collection Time: 12/20/17  5:22 PM   Result Value Ref Range    RBC Morphology Present     Poikilocytosis 2+     Stomatocytes 2+     Reactive Lymphocytes Few    IMMATURE PLT FRACTION     Collection Time: 12/20/17  5:22 PM   Result Value Ref Range    Imm. Plt Fraction 12.1 0.6 - 13.1 K/uL   LACTIC ACID    Collection Time: 12/20/17  7:09 PM   Result Value Ref Range    Lactic Acid 1.7 0.5 - 2.0 mmol/L   INFLUENZA A/B BY PCR    Collection Time: 12/20/17  7:25 PM   Result Value Ref Range    Influenza virus A RNA Negative Negative    Influenza virus B, PCR Negative Negative   URINALYSIS,CULTURE IF INDICATED    Collection Time: 12/20/17  8:06 PM   Result Value Ref Range    Color Yellow     Character Clear     Specific Gravity 1.011 <1.035    Ph 5.5 5.0 - 8.0    Glucose Negative Negative mg/dL    Ketones Negative Negative mg/dL    Protein Negative Negative mg/dL    Bilirubin Negative Negative    Urobilinogen, Urine 0.2 Negative    Nitrite Negative Negative    Leukocyte Esterase Moderate (A) Negative    Occult Blood Negative Negative    Micro Urine Req Microscopic     Culture Indicated Yes UA Culture   URINE MICROSCOPIC (W/UA)    Collection Time: 12/20/17  8:06 PM   Result Value Ref Range    WBC 10-20 (A) /hpf    RBC 2-5 (A) /hpf    Bacteria Negative None /hpf    Epithelial Cells Few /hpf    Hyaline Cast 0-2 /lpf   ABO CONFIRMATION    Collection Time: 12/20/17  8:14 PM   Result Value Ref Range    Second ABO Typing With Cod B        Imaging/Procedures Review:    ndependant Imaging Review: Completed  DX-CHEST-LIMITED (1 VIEW)   Final Result      1.  No acute cardiac or pulmonary abnormalities are identified.               EKG:   EKG Independant Review: Completed  QTc: 448, HR: 109, Normal Sinus Tachycardia, no ST/T changes        Assessment/Plan     Anemia- (present on admission)   Assessment & Plan    72 year old woman with no major medical problems in the past presents with shortness of breath and fatigue.  She was found to be anemic, thrombocytopenic with leukocytosis. Denied any active bleeding. Troponin and EKG negative for features of ACS/MI. SOB and chest tightness most likely related to her anemia.  Fatigue may also be related to anemia however will do additional work-up. FOBT done by ERP was negative. She has received 1 unit of blood in ED. Last colonoscopy was about 10 years, negative.      Plan  - Q6H H &H  - Transfuse if Hb < 7  - No pharmacological DVT prophylaxis given the anemia and thrombocytopenia. SCDs+  - Consider hem/onco consultation in AM; bone marrow biopsy may be helpful  - Will repeat FOBT  - Anemia work-up including: Iron panel, ferritin, B12/Folate  - Will also check LDH, Hepatitis panel (h/o severe hepatic steatosis seen in 2008 CT chest), HIV, ESR, uric acid, TSH        Leukocytosis- (present on admission)   Assessment & Plan    Leukocytosis of 45 with lymphocytic predominance. No obvious source of infection. Does endorse a cough, however appears to be chronic. Previous CTs have noted chronic lung scarring. CXR done today was negative for infectious process. U/A was abnormal with moderate leuk esterase and WBC 10-20 however patient denied any urinary symptoms.    Plan:  - Patient has not had any recent fevers, no urinary symptoms. Will not start antibiotics at this time. Consider antibiotics if she begins to develop urinary symptoms or begins developing fevers.   - Blood cultures were ordered in ED, follow-up results. Consider repeating if she begins developing fevers  - IV fluids for hydration  - Hem/Onc consult in AM for possible bone marrow biopsy  -         Thrombocytopenia (CMS-HCC)- (present on admission)   Assessment & Plan    - Watch for acute bleeding given low platelet count  - No pharmacological DVT prophylaxis given the anemia and thrombocytopenia. SCDs+  - Consider hem/onco consultation in AM; bone marrow biopsy may be helpful            Anticipated Hospital stay:  >2 midnights        Quality Measures    Reviewed items::  EKG reviewed, Radiology images reviewed, Labs reviewed and Medications reviewed  Ngo catheter::  No Ngo  DVT prophylaxis pharmacological::   Contraindicated - Anemia requiring blood transfusion  DVT prophylaxis - mechanical:  SCDs  Ulcer Prophylaxis::  Not indicated

## 2017-12-21 NOTE — SENIOR ADMIT NOTE
CC: SOB, CP, fatigue  HPI: 72 year old female presents to the ED after being sent in by her PCP for CP, SOB. Patient states that her SOB has been going on for roughly 2 months now and seems to be worsening. Her SOB seems to be episodic with no obvious trigger and she does have chest discomfort that only occurs when she has SOB and resolves when she catches her breath. Her SOB is also associated with palpitations. Patient has experienced fatigue for that same duration and for 2 weeks her fatigue was so severe that she couldn't leave her house. She also c/o associated weight loss and anorexia. She denies any blood in her stool or urine and had a negative colonoscopy 10 years prior. Brother has a history of amyloid and brain cancer and her nephew has a history of non-hodgkin lymphoma.     PP on ROS: CP, SOB, fatigue, thinning hair, anorexia, cough, weight loss, palpitations  PP on PE: None     A/P:   1) CP, COB, fatigue  - May be 2/2 anemia  - Symptoms concerning for malignancy  - Previous negative MPI stress, echo  - Likely needs hematology consult for BM biopsy, may be  - Gentle IV fluids  - Check TSH  - Tele    2) Leukocytosis  - WBC count 45.5, lymphocytic predominance  - Does not appear toxic, will hold off on abx at this time. Watch closely for any signs of infection.  - ROS performed with no obvious source    3) Anemia, thrombocytopenia  - Hgb 6.3, platelet 22  - Received 1 U PRBC in ED  - Transfuse for hgb<7  - Trend H+H q6hr  - Fe panel, ferritin, folate, B12, HIV, LDH, retic count, uric acid, ESR  - Heme consult in AM for BM biopsy        CODE STATUS: Full code  DVT prophylaxis: SCD's

## 2017-12-21 NOTE — PROGRESS NOTES
Kaci from Lab called with critical result of platelet 18, WBC 31.8 at 1415. Critical lab result read back to Kaci.   Dr. Arizmendi notified of critical lab result at 1420.  Critical lab result read back by Dr. Arizmendi.

## 2017-12-21 NOTE — ED PROVIDER NOTES
ED PROVIDER NOTE     Scribed for Jeff Mcgraw M.D. by Houston Noe. 12/20/2017, 6:50 PM.    CHIEF COMPLAINT  Chief Complaint   Patient presents with   • Sent by MD     cardiology   • Chest Pain     chest tightness 1 month, constant rapid hr   • Shortness of Breath     with exertion       HPI    Primary care provider: Elan Ashraf M.D.  Means of arrival: Walk-in  History obtained from: Patient  History limited by: None    Puja Briscoe is a 72 y.o. female who presents with shortness of breath. Patient was referred to the ED by her primary care physician, Dr. Ashraf, after seeing a provider in his office today for shortness of breath. She has been experiencing the shortness of breath for the past month on exacerbation with associated heart palpitations and chest tightness. She also reports being so tired that she was unable to leave her bed for a 2 week period. No one at the patient's home is currently ill. No obvious alleviating measures. No prior episodes. At worst symptoms severe, at present mild.   She denies any drug allergies or current medications but was seen at [facility] a few years ago for heart palpitations. Patient also has a family history of diabetes but denies a history of cancer. She had a colonoscopy 10 years ago which did not reveal any polyps.   She denies fever, hematochezia, bruising, swelling, abdominal pain, near syncope, bleeds, nausea, vomiting, weight changes.    REVIEW OF SYSTEMS  Constitutional: Negative for fever, chills.   HENT: Negative for nosebleeds or sore throat.    Eyes: Negative for vision changes or discharge.   Respiratory: Negative for shortness of breath.    Cardiovascular: Positive for chest tightness, palpitations.   Gastrointestinal: Negative for nausea, vomiting, abdominal pain, hematochezia.   : No dysuria, hematuria.  Musculoskeletal: Negative for swelling or joint pain.   Skin: Negative for bruising or rash.   Neurological: Negative for near syncope.  "  Endo/Heme/Allergies: Negative for weight changes or bleeding.   Psychiatric/Behavioral: Positive for tiredness, no mood changes.   C.    PAST MEDICAL HISTORY   has a past medical history of biopsy (1997).  No active health problems, takes only vitamins    PAST FAMILY HISTORY  Diabetes    SOCIAL HISTORY  Social History     Social History Main Topics   • Smoking status: Never Smoker   • Alcohol use Yes      Comment: socially   • Drug use: Unknown       SURGICAL HISTORY   has a past surgical history that includes hysterectomy laparoscopy; knee orif; and us-needle core bx-breast panel.    CURRENT MEDICATIONS    Current Outpatient Prescriptions on File Prior to Encounter   Medication Sig Dispense Refill   • raloxifene (EVISTA) 60 MG TABS Take 60 mg by mouth every day.        ALLERGIES  No Known Allergies    PHYSICAL EXAM  VITAL SIGNS: BP (!) 161/49   Pulse (!) 128   Temp 36.6 °C (97.9 °F) (Temporal)   Resp 16   Ht 1.651 m (5' 5\")   Wt 56.1 kg (123 lb 10.9 oz)   SpO2 96%   BMI 20.58 kg/m²    Pulse ox interpretation: On room air, I interpret this pulse ox as normal.  Constitutional: Well developed, well nourished. No acute distress.  HEENT: Normocephalic, atraumatic. Posterior pharynx clear, mucous membranes moist.  Eyes:  EOMI. Normal sclera.  Neck: Supple, Full range of motion, nontender.  Chest/Pulmonary: Clear to ausculation bilaterally, no wheezes or rhonchi.  Cardiovascular: Regular rhythm, tachycardic with a 3/6 systolic murmur.  Abdomen: Soft, nontender, no rebound, guarding, or masses.  Back: No CVA tenderness, nontender midline, no step offs.  Rectal: Brown stool, FOB negative.  Musculoskeletal: No deformity, no edema.  Neuro: Clear speech, normal coordination, cranial nerves II-XII grossly intact.  Psych: Normal mood and affect.  Skin: No rashes, warm and dry.     DIAGNOSTIC STUDIES / PROCEDURES    LABS & EKG  Results for orders placed or performed during the hospital encounter of 12/20/17   Troponin "   Result Value Ref Range    Troponin I <0.01 0.00 - 0.04 ng/mL   Btype Natriuretic Peptide   Result Value Ref Range    B Natriuretic Peptide 87 0 - 100 pg/mL   CBC with Differential   Result Value Ref Range    WBC 45.5 (HH) 4.8 - 10.8 K/uL    RBC 1.74 (L) 4.20 - 5.40 M/uL    Hemoglobin 6.3 (L) 12.0 - 16.0 g/dL    Hematocrit 21.1 (L) 37.0 - 47.0 %    .3 (H) 81.4 - 97.8 fL    MCH 36.2 (H) 27.0 - 33.0 pg    MCHC 29.9 (L) 33.6 - 35.0 g/dL    RDW 86.6 (H) 35.9 - 50.0 fL    Platelet Count 22 (LL) 164 - 446 K/uL    MPV 11.1 9.0 - 12.9 fL    Nucleated RBC 0.80 /100 WBC    NRBC (Absolute) 0.36 K/uL    Neutrophils-Polys 13.00 (L) 44.00 - 72.00 %    Lymphocytes 73.00 (H) 22.00 - 41.00 %    Monocytes 3.50 0.00 - 13.40 %    Eosinophils 0.00 0.00 - 6.90 %    Basophils 0.00 0.00 - 1.80 %    Neutrophils (Absolute) 7.92 (H) 2.00 - 7.15 K/uL    Lymphs (Absolute) 33.22 (H) 1.00 - 4.80 K/uL    Monos (Absolute) 1.59 (H) 0.00 - 0.85 K/uL    Eos (Absolute) 0.00 0.00 - 0.51 K/uL    Baso (Absolute) 0.00 0.00 - 0.12 K/uL    Anisocytosis 1+     Macrocytosis 1+    Complete Metabolic Panel (CMP)   Result Value Ref Range    Sodium 138 135 - 145 mmol/L    Potassium 4.1 3.6 - 5.5 mmol/L    Chloride 105 96 - 112 mmol/L    Co2 23 20 - 33 mmol/L    Anion Gap 10.0 0.0 - 11.9    Glucose 101 (H) 65 - 99 mg/dL    Bun 12 8 - 22 mg/dL    Creatinine 0.70 0.50 - 1.40 mg/dL    Calcium 9.3 8.5 - 10.5 mg/dL    AST(SGOT) 20 12 - 45 U/L    ALT(SGPT) 13 2 - 50 U/L    Alkaline Phosphatase 80 30 - 99 U/L    Total Bilirubin 1.1 0.1 - 1.5 mg/dL    Albumin 4.2 3.2 - 4.9 g/dL    Total Protein 6.8 6.0 - 8.2 g/dL    Globulin 2.6 1.9 - 3.5 g/dL    A-G Ratio 1.6 g/dL   Prothrombin Time   Result Value Ref Range    PT 13.5 12.0 - 14.6 sec    INR 1.06 0.87 - 1.13   APTT   Result Value Ref Range    APTT 22.6 (L) 24.7 - 36.0 sec   Lipase   Result Value Ref Range    Lipase 22 11 - 82 U/L   ESTIMATED GFR   Result Value Ref Range    GFR If  >60 >60  mL/min/1.73 m 2    GFR If Non African American >60 >60 mL/min/1.73 m 2   COD (ADULT)   Result Value Ref Range    ABO Grouping Only B     Rh Grouping Only POS     Antibody Screen-Cod NEG     Component R       R3                  Red Blood Cells3    [id number]   transfused   12/20/17   21:22      Product Type Red Blood Cells LR Pheresis     Dispense Status Transfused     Unit Number (Barcoded) [id number]     Product Code (Barcoded) B0757I70     Blood Type (Barcoded) 7300     Component R       R3                  Red Blood Cells3    [id number]   released     12/21/17   09:58      Component R       R3                  Red Blood Cells3    [id number]   issued       12/21/17   10:01      Product Type Red Blood Cells LR Pheresis     Dispense Status Issued     Unit Number (Barcoded) [id number]     Product Code (Barcoded) B9512F87     Blood Type (Barcoded) 7300    INFLUENZA A/B BY PCR   Result Value Ref Range    Influenza virus A RNA Negative Negative    Influenza virus B, PCR Negative Negative   LACTIC ACID   Result Value Ref Range    Lactic Acid 1.7 0.5 - 2.0 mmol/L   BLOOD CULTURE x2   Result Value Ref Range    Significant Indicator NEG     Source BLD     Site PERIPHERAL     Blood Culture       No Growth    Note: Blood cultures are incubated for 5 days and  are monitored continuously.Positive blood cultures  are called to the RN and reported as soon as  they are identified.     BLOOD CULTURE x2   Result Value Ref Range    Significant Indicator NEG     Source BLD     Site PERIPHERAL     Blood Culture       No Growth    Note: Blood cultures are incubated for 5 days and  are monitored continuously.Positive blood cultures  are called to the RN and reported as soon as  they are identified.     URINALYSIS,CULTURE IF INDICATED   Result Value Ref Range    Color Yellow     Character Clear     Specific Gravity 1.011 <1.035    Ph 5.5 5.0 - 8.0    Glucose Negative Negative mg/dL    Ketones Negative Negative mg/dL     Protein Negative Negative mg/dL    Bilirubin Negative Negative    Urobilinogen, Urine 0.2 Negative    Nitrite Negative Negative    Leukocyte Esterase Moderate (A) Negative    Occult Blood Negative Negative    Micro Urine Req Microscopic     Culture Indicated Yes UA Culture   ABO CONFIRMATION   Result Value Ref Range    Second ABO Typing With Cod B    DIFFERENTIAL MANUAL   Result Value Ref Range    Bands-Stabs 4.40 0.00 - 10.00 %    Metamyelocytes 0.90 %    Myelocytes 4.30 %    Progranulocytes 0.90 %    Manual Diff Status PERFORMED    PERIPHERAL SMEAR REVIEW   Result Value Ref Range    Peripheral Smear Review see below    PLATELET ESTIMATE   Result Value Ref Range    Plt Estimation Marked Decrease    MORPHOLOGY   Result Value Ref Range    RBC Morphology Present     Poikilocytosis 2+     Stomatocytes 2+     Reactive Lymphocytes Few    IMMATURE PLT FRACTION   Result Value Ref Range    Imm. Plt Fraction 12.1 0.6 - 13.1 K/uL   URINE MICROSCOPIC (W/UA)   Result Value Ref Range    WBC 10-20 (A) /hpf    RBC 2-5 (A) /hpf    Bacteria Negative None /hpf    Epithelial Cells Few /hpf    Hyaline Cast 0-2 /lpf   WESTERGREN SED RATE   Result Value Ref Range    Sed Rate Westergren 77 (H) 0 - 30 mm/hour   HIV ANTIBODIES   Result Value Ref Range    HIV Ag/Ab Combo Assay Non Reactive Non Reactive   LDH   Result Value Ref Range    LDH Total 220 107 - 266 U/L   URIC ACID   Result Value Ref Range    Uric Acid 5.8 1.9 - 8.2 mg/dL   HEPATITIS PANEL ACUTE(4 COMPONENTS)   Result Value Ref Range    Hepatitis B Surface Antigen Negative Negative    Hepatitis C Antibody Negative Negative    Hepatitis B Cors Ab,IgM Negative Negative    Hepatitis A Virus Ab, IgM Negative Negative   Comp Metabolic Panel (CMP)   Result Value Ref Range    Sodium 139 135 - 145 mmol/L    Potassium 3.7 3.6 - 5.5 mmol/L    Chloride 108 96 - 112 mmol/L    Co2 24 20 - 33 mmol/L    Anion Gap 7.0 0.0 - 11.9    Glucose 94 65 - 99 mg/dL    Bun 11 8 - 22 mg/dL    Creatinine 0.62  0.50 - 1.40 mg/dL    Calcium 8.8 8.5 - 10.5 mg/dL    AST(SGOT) 16 12 - 45 U/L    ALT(SGPT) 10 2 - 50 U/L    Alkaline Phosphatase 62 30 - 99 U/L    Total Bilirubin 1.8 (H) 0.1 - 1.5 mg/dL    Albumin 3.4 3.2 - 4.9 g/dL    Total Protein 5.4 (L) 6.0 - 8.2 g/dL    Globulin 2.0 1.9 - 3.5 g/dL    A-G Ratio 1.7 g/dL   ESTIMATED GFR   Result Value Ref Range    GFR If African American >60 >60 mL/min/1.73 m 2    GFR If Non African American >60 >60 mL/min/1.73 m 2   CBC with Differential   Result Value Ref Range    WBC 30.2 (HH) 4.8 - 10.8 K/uL    RBC 1.78 (L) 4.20 - 5.40 M/uL    Hemoglobin 6.2 (L) 12.0 - 16.0 g/dL    Hematocrit 20.4 (L) 37.0 - 47.0 %    .6 (H) 81.4 - 97.8 fL    MCH 34.8 (H) 27.0 - 33.0 pg    MCHC 30.4 (L) 33.6 - 35.0 g/dL    RDW 96.5 (H) 35.9 - 50.0 fL    Platelet Count 17 (LL) 164 - 446 K/uL    MPV 11.0 9.0 - 12.9 fL    Nucleated RBC 0.90 /100 WBC    NRBC (Absolute) 0.27 K/uL   EBV CHRONIC/ACTIVE INFECTION   Result Value Ref Range    Order See reflex test    EKG (NOW)   Result Value Ref Range    Report       [facility]    Test Date:  2017  Pt Name:    JUDAH BORJAS                  Department: ER  MRN:        [id number]                      Room:       T724  Gender:     Female                       Technician: [id number]  :        [id number]                   Requested By:ER TRIAGE PROTOCOL  Order #:    [id number]                    Reading MD: Jeff Mcgraw MD    Measurements  Intervals                                Axis  Rate:       109                          P:          82  MI:         192                          QRS:        12  QRSD:       82                           T:          41  QT:         332  QTc:        448    Interpretive Statements  SINUS TACHYCARDIA  PROBABLE LEFT ATRIAL ABNORMALITY  no stemi or dysrhythmia  Compared to ECG 10/15/2011 14:33:46  No significant changes    Electronically Signed On 2017 13:36:26 PST by Jeff Mcgraw MD           RADIOLOGY  DX-CHEST-LIMITED (1 VIEW)   Final Result      1.  No acute cardiac or pulmonary abnormalities are identified.      DX-CHEST-2 VIEWS    (Results Pending)     COURSE & MEDICAL DECISION MAKING    This is a 72 y.o. female who presents with CARMONA, fatigue.     Differential Diagnosis includes but is not limited to:  Anemia, malignancy, infection, ACS, clot    ED Course:    6:50 PM Patient seen at bedside. Informed her that her triage lab results are concerning for acute anemia as her hemoglobin is ~6. Recommended a blood transfusion and the patient agrees after discussing risks and precautions, consented for transfusion.  Ordered for DX chest, lactic acid, blood culture, urinalysis, influenza, COD, eGFR, BNP, CBC, CMP, prothrombin time, APTT, lipase, and EKG to evaluate her symptoms.      EKG sinus tach no stemi or strain. Trop neg doubt ACS. CXR no e/o chf/edema/infection. BNP wnl doubt failure. Flu negative. Lactate normal doubt sepsis. No obvious infection. FOB negative doubt GIB.     8:04 PM Heart rate down to 110 and reports feeling well. Stable BP and o2.     8:24 PM Consulted with [facility] regarding the above case findings. They agree to admit the patient at this time. Concern for occult malignancy or other insidious cause. Tolerating transfusion, stable for admit to medical floor.     Medications   NS infusion (1,000 mL Intravenous Canceled Entry 12/21/17 0010)   senna-docusate (PERICOLACE or SENOKOT S) 8.6-50 MG per tablet 2 Tab (2 Tabs Oral Refused 12/21/17 4514)     And   polyethylene glycol/lytes (MIRALAX) PACKET 1 Packet (not administered)     And   magnesium hydroxide (MILK OF MAGNESIA) suspension 30 mL (not administered)     And   bisacodyl (DULCOLAX) suppository 10 mg (not administered)   Respiratory Care per Protocol (not administered)   NS infusion ( Intravenous New Bag 12/20/17 3225)   guaifenesin dextromethorphan (ROBITUSSIN DM) 100-10 MG/5ML syrup 10 mL (not administered)   acetaminophen  (TYLENOL) tablet 650 mg (not administered)   pneumococcal 13-Janeth Conj Vacc (PREVNAR 13) syringe 0.5 mL (not administered)   potassium chloride SA (Kdur) tablet 30 mEq (10 mEq Oral Given 12/21/17 0446)   potassium chloride SA (Kdur) tablet 20 mEq (20 mEq Oral Given 12/21/17 0514)   SODIUM CHLORIDE 0.9 % IV SOLN (  New Bag 12/21/17 0900)     FINAL IMPRESSION  1. CARMONA (dyspnea on exertion)    2. Anemia, unspecified type    3. Thrombocytopenia (CMS-HCC)    4. Lymphocytosis    5. Tachycardia    6. Other chest pain      -ADMIT-    Pertinent Labs & Imaging studies reviewed and verified by myself, as well as nursing notes and the patient's past medical, family, and social histories (See chart for details).    Results, exam findings, clinical impression, presumed diagnosis, treatment options, and plan to admit were discussed with the patient, and they verbalized understanding, agreed with, and appreciated the plan of care.    Houston AKBAR (Blanca), am scribing for, and in the presence of, Jeff Mcgraw M.D..    Electronically signed by: Houston Noe (Blanca), 12/20/2017    Jeff AKBAR M.D. personally performed the services described in this documentation, as scribed by Houston Noe in my presence, and it is both accurate and complete.    The note accurately reflects work and decisions made by me.  Jeff Mcgraw  12/21/2017  1:39 PM

## 2017-12-21 NOTE — ASSESSMENT & PLAN NOTE
No pharmacological DVT prophylaxis given the anemia and thrombocytopenia.   SCDs+  HIV, hepatitis  c negative.   EBV antibody positive.   PC 16-->19-->17-->18-->20  Plan:  Pt to get daily CBCs at [facility] with instructions to call Dr. Martinez with results and for transfusion orders.  Pt has an appointment with Dr. Martinez on 1/8/18 to discuss biopsy results and f/u

## 2017-12-21 NOTE — PROGRESS NOTES
Bedside report received. Called lab phlebotomy and notified need CBC drawn now. Pt updated in POC.

## 2017-12-21 NOTE — ED NOTES
Pt updated of POC, pt resting comfortably in cart, pt has no complaints at this time. Pt signs blood consent.

## 2017-12-21 NOTE — PROGRESS NOTES
Pt resting in bed at this time. Even visible chest rise. No signs of distress. Bed alarm on. Call light in place.. Bed in low and locked position.

## 2017-12-22 ENCOUNTER — APPOINTMENT (OUTPATIENT)
Dept: RADIOLOGY | Facility: MEDICAL CENTER | Age: 72
DRG: 841 | End: 2017-12-22
Attending: STUDENT IN AN ORGANIZED HEALTH CARE EDUCATION/TRAINING PROGRAM
Payer: MEDICARE

## 2017-12-22 LAB
ALBUMIN SERPL BCP-MCNC: 2.7 G/DL (ref 3.2–4.9)
ALBUMIN/GLOB SERPL: 1.1 G/DL
ALP SERPL-CCNC: 54 U/L (ref 30–99)
ALT SERPL-CCNC: 8 U/L (ref 2–50)
ANION GAP SERPL CALC-SCNC: 7 MMOL/L (ref 0–11.9)
ANISOCYTOSIS BLD QL SMEAR: ABNORMAL
AST SERPL-CCNC: 15 U/L (ref 12–45)
BACTERIA UR CULT: NORMAL
BASOPHILS # BLD AUTO: 0 % (ref 0–1.8)
BASOPHILS # BLD: 0 K/UL (ref 0–0.12)
BILIRUB SERPL-MCNC: 1.2 MG/DL (ref 0.1–1.5)
BUN SERPL-MCNC: 8 MG/DL (ref 8–22)
CALCIUM SERPL-MCNC: 8.1 MG/DL (ref 8.5–10.5)
CHLORIDE SERPL-SCNC: 115 MMOL/L (ref 96–112)
CO2 SERPL-SCNC: 19 MMOL/L (ref 20–33)
CREAT SERPL-MCNC: 0.48 MG/DL (ref 0.5–1.4)
EOSINOPHIL # BLD AUTO: 0.55 K/UL (ref 0–0.51)
EOSINOPHIL NFR BLD: 1.7 % (ref 0–6.9)
ERYTHROCYTE [DISTWIDTH] IN BLOOD BY AUTOMATED COUNT: 86.9 FL (ref 35.9–50)
GFR SERPL CREATININE-BSD FRML MDRD: >60 ML/MIN/1.73 M 2
GLOBULIN SER CALC-MCNC: 2.5 G/DL (ref 1.9–3.5)
GLUCOSE SERPL-MCNC: 88 MG/DL (ref 65–99)
HCT VFR BLD AUTO: 22.5 % (ref 37–47)
HGB BLD-MCNC: 7.1 G/DL (ref 12–16)
LYMPHOCYTES # BLD AUTO: 24.2 K/UL (ref 1–4.8)
LYMPHOCYTES NFR BLD: 75.4 % (ref 22–41)
MACROCYTES BLD QL SMEAR: ABNORMAL
MANUAL DIFF BLD: NORMAL
MCH RBC QN AUTO: 35 PG (ref 27–33)
MCHC RBC AUTO-ENTMCNC: 31.6 G/DL (ref 33.6–35)
MCV RBC AUTO: 110.8 FL (ref 81.4–97.8)
METAMYELOCYTES NFR BLD MANUAL: 1.8 %
MONOCYTES # BLD AUTO: 1.12 K/UL (ref 0–0.85)
MONOCYTES NFR BLD AUTO: 3.5 % (ref 0–13.4)
MORPHOLOGY BLD-IMP: NORMAL
MYELOCYTES NFR BLD MANUAL: 1.8 %
NEUTROPHILS # BLD AUTO: 5.07 K/UL (ref 2–7.15)
NEUTROPHILS NFR BLD: 14.9 % (ref 44–72)
NEUTS BAND NFR BLD MANUAL: 0.9 % (ref 0–10)
NRBC # BLD AUTO: 0.24 K/UL
NRBC BLD-RTO: 0.7 /100 WBC
PLATELET # BLD AUTO: 16 K/UL (ref 164–446)
PLATELET BLD QL SMEAR: NORMAL
PLATELETS.RETICULATED NFR BLD AUTO: 10.2 K/UL (ref 0.6–13.1)
PMV BLD AUTO: 11.6 FL (ref 9–12.9)
POLYCHROMASIA BLD QL SMEAR: NORMAL
POTASSIUM SERPL-SCNC: 3.7 MMOL/L (ref 3.6–5.5)
PROT SERPL-MCNC: 5.2 G/DL (ref 6–8.2)
RBC # BLD AUTO: 2.03 M/UL (ref 4.2–5.4)
RBC BLD AUTO: PRESENT
SIGNIFICANT IND 70042: NORMAL
SITE SITE: NORMAL
SODIUM SERPL-SCNC: 141 MMOL/L (ref 135–145)
SOURCE SOURCE: NORMAL
WBC # BLD AUTO: 32.1 K/UL (ref 4.8–10.8)

## 2017-12-22 PROCEDURE — 38221 DX BONE MARROW BIOPSIES: CPT | Performed by: HOSPITALIST

## 2017-12-22 PROCEDURE — 88305 TISSUE EXAM BY PATHOLOGIST: CPT

## 2017-12-22 PROCEDURE — 700105 HCHG RX REV CODE 258: Performed by: STUDENT IN AN ORGANIZED HEALTH CARE EDUCATION/TRAINING PROGRAM

## 2017-12-22 PROCEDURE — 160035 HCHG PACU - 1ST 60 MINS PHASE I: Performed by: HOSPITALIST

## 2017-12-22 PROCEDURE — 80053 COMPREHEN METABOLIC PANEL: CPT

## 2017-12-22 PROCEDURE — 07DR3ZX EXTRACTION OF ILIAC BONE MARROW, PERCUTANEOUS APPROACH, DIAGNOSTIC: ICD-10-PCS | Performed by: HOSPITALIST

## 2017-12-22 PROCEDURE — 88311 DECALCIFY TISSUE: CPT

## 2017-12-22 PROCEDURE — 700111 HCHG RX REV CODE 636 W/ 250 OVERRIDE (IP)

## 2017-12-22 PROCEDURE — 770020 HCHG ROOM/CARE - TELE (206)

## 2017-12-22 PROCEDURE — 99152 MOD SED SAME PHYS/QHP 5/>YRS: CPT | Performed by: HOSPITALIST

## 2017-12-22 PROCEDURE — 88184 FLOWCYTOMETRY/ TC 1 MARKER: CPT

## 2017-12-22 PROCEDURE — 88341 IMHCHEM/IMCYTCHM EA ADD ANTB: CPT | Mod: 91

## 2017-12-22 PROCEDURE — 88237 TISSUE CULTURE BONE MARROW: CPT | Mod: 91

## 2017-12-22 PROCEDURE — A9270 NON-COVERED ITEM OR SERVICE: HCPCS | Performed by: STUDENT IN AN ORGANIZED HEALTH CARE EDUCATION/TRAINING PROGRAM

## 2017-12-22 PROCEDURE — 700111 HCHG RX REV CODE 636 W/ 250 OVERRIDE (IP): Performed by: STUDENT IN AN ORGANIZED HEALTH CARE EDUCATION/TRAINING PROGRAM

## 2017-12-22 PROCEDURE — 88280 CHROMOSOME KARYOTYPE STUDY: CPT

## 2017-12-22 PROCEDURE — 700102 HCHG RX REV CODE 250 W/ 637 OVERRIDE(OP): Performed by: STUDENT IN AN ORGANIZED HEALTH CARE EDUCATION/TRAINING PROGRAM

## 2017-12-22 PROCEDURE — 160027 HCHG SURGERY MINUTES - 1ST 30 MINS LEVEL 2: Performed by: HOSPITALIST

## 2017-12-22 PROCEDURE — 88342 IMHCHEM/IMCYTCHM 1ST ANTB: CPT

## 2017-12-22 PROCEDURE — 85027 COMPLETE CBC AUTOMATED: CPT

## 2017-12-22 PROCEDURE — 88313 SPECIAL STAINS GROUP 2: CPT

## 2017-12-22 PROCEDURE — 76700 US EXAM ABDOM COMPLETE: CPT

## 2017-12-22 PROCEDURE — 88291 CYTO/MOLECULAR REPORT: CPT

## 2017-12-22 PROCEDURE — 85007 BL SMEAR W/DIFF WBC COUNT: CPT

## 2017-12-22 PROCEDURE — 160002 HCHG RECOVERY MINUTES (STAT): Performed by: HOSPITALIST

## 2017-12-22 PROCEDURE — 88264 CHROMOSOME ANALYSIS 20-25: CPT

## 2017-12-22 PROCEDURE — 88360 TUMOR IMMUNOHISTOCHEM/MANUAL: CPT | Mod: 91

## 2017-12-22 PROCEDURE — 85055 RETICULATED PLATELET ASSAY: CPT

## 2017-12-22 PROCEDURE — 99233 SBSQ HOSP IP/OBS HIGH 50: CPT | Mod: GC | Performed by: INTERNAL MEDICINE

## 2017-12-22 PROCEDURE — 36415 COLL VENOUS BLD VENIPUNCTURE: CPT

## 2017-12-22 PROCEDURE — 88185 FLOWCYTOMETRY/TC ADD-ON: CPT | Mod: 91

## 2017-12-22 PROCEDURE — 160048 HCHG OR STATISTICAL LEVEL 1-5: Performed by: HOSPITALIST

## 2017-12-22 RX ORDER — MIDAZOLAM HYDROCHLORIDE 1 MG/ML
INJECTION INTRAMUSCULAR; INTRAVENOUS
Status: DISCONTINUED | OUTPATIENT
Start: 2017-12-22 | End: 2017-12-22 | Stop reason: HOSPADM

## 2017-12-22 RX ORDER — SODIUM CHLORIDE 9 MG/ML
INJECTION, SOLUTION INTRAVENOUS CONTINUOUS
Status: DISCONTINUED | OUTPATIENT
Start: 2017-12-22 | End: 2017-12-22

## 2017-12-22 RX ORDER — MIDAZOLAM HYDROCHLORIDE 1 MG/ML
.5-2 INJECTION INTRAMUSCULAR; INTRAVENOUS PRN
Status: ACTIVE | OUTPATIENT
Start: 2017-12-22 | End: 2017-12-22

## 2017-12-22 RX ORDER — PREDNISONE 20 MG/1
60 TABLET ORAL DAILY
Status: DISCONTINUED | OUTPATIENT
Start: 2017-12-22 | End: 2017-12-27 | Stop reason: HOSPADM

## 2017-12-22 RX ORDER — SODIUM CHLORIDE 9 MG/ML
500 INJECTION, SOLUTION INTRAVENOUS
Status: ACTIVE | OUTPATIENT
Start: 2017-12-22 | End: 2017-12-22

## 2017-12-22 RX ORDER — POTASSIUM CHLORIDE 20 MEQ/1
40 TABLET, EXTENDED RELEASE ORAL ONCE
Status: COMPLETED | OUTPATIENT
Start: 2017-12-22 | End: 2017-12-22

## 2017-12-22 RX ADMIN — SODIUM CHLORIDE: 9 INJECTION, SOLUTION INTRAVENOUS at 11:01

## 2017-12-22 RX ADMIN — PREDNISONE 60 MG: 20 TABLET ORAL at 15:54

## 2017-12-22 RX ADMIN — POTASSIUM CHLORIDE 40 MEQ: 1500 TABLET, EXTENDED RELEASE ORAL at 08:22

## 2017-12-22 RX ADMIN — SODIUM CHLORIDE: 9 INJECTION, SOLUTION INTRAVENOUS at 02:09

## 2017-12-22 RX ADMIN — SODIUM CHLORIDE: 9 INJECTION, SOLUTION INTRAVENOUS at 08:22

## 2017-12-22 ASSESSMENT — ENCOUNTER SYMPTOMS
HEMOPTYSIS: 0
COUGH: 0
DEPRESSION: 0
SINUS PAIN: 0
BACK PAIN: 0
SPUTUM PRODUCTION: 0
STRIDOR: 0
SHORTNESS OF BREATH: 1
BLURRED VISION: 0
NECK PAIN: 0
TREMORS: 0
WEIGHT LOSS: 1
DIARRHEA: 0
BRUISES/BLEEDS EASILY: 0
DIZZINESS: 0
FEVER: 0
VOMITING: 0
ABDOMINAL PAIN: 0
FOCAL WEAKNESS: 0
NERVOUS/ANXIOUS: 0
SORE THROAT: 0
HALLUCINATIONS: 0
CONSTIPATION: 0
DIAPHORESIS: 0
SPEECH CHANGE: 0
NAUSEA: 0
HEARTBURN: 0
DOUBLE VISION: 0
MYALGIAS: 0
PALPITATIONS: 0
SPUTUM PRODUCTION: 1
CHILLS: 0
WEAKNESS: 1
COUGH: 1
PHOTOPHOBIA: 0
SENSORY CHANGE: 0

## 2017-12-22 ASSESSMENT — LIFESTYLE VARIABLES
DO YOU DRINK ALCOHOL: NO
SUBSTANCE_ABUSE: 0

## 2017-12-22 ASSESSMENT — PAIN SCALES - GENERAL
PAINLEVEL_OUTOF10: 0

## 2017-12-22 NOTE — CONSULTS
DATE OF SERVICE:  2017    Consultation was called by Dr. Arizmendi.    REASON FOR CONSULTATION:  1.  Severe anemia, macrocytic.  2.  Lymphocytosis.    HISTORY OF PRESENT ILLNESS:  The patient is a very pleasant 72-year-old lady   who was seen by me as an inpatient at [facility] on   2017 for the above-mentioned problems.  She was admitted to [facility] on 2017 with the shortness of breath and fatigue which started   sometimes in early 2017.  The patient started getting short of breath   even walking short distances like to her mailbox.  This was accompanied by   some chest tightness, but she denies any chest pain as such.  There is no   orthopnea or any PND.  She denies any fevers or any chills.  In the ER, she   was found to have a hemoglobin of 6.3 and 22,000 platelets.  Also noted was an   elevated white cell count with a preponderance of lymphocytes.  Other than   that, the patient is very healthy.  She denies taking any medications at home.    She denies any history of hypertension, diabetes, or any dyslipidemia.    PAST MEDICAL HISTORY:  She denies any history of any significant medical   issues at this time.    FAMILY HISTORY:  Brother had amyloidosis and brain tumor.  Her nephew,   brother's son  of non-Hodgkin's lymphoma.  Otherwise, she denies any   hematological or any other malignancy in the extended family.    PERSONAL AND SOCIAL HISTORY:  Nonsmoker, nondrinker, lives with her .    REVIEW OF SYSTEMS:  GENERAL AND CONSTITUTIONAL:  Complains of fatigue, but denies any fevers,   night sweats, or any unexplained weight loss.  HEAD, EYES, NECK, EARS, NOSE AND THROAT:  Denies any headaches or any visual   symptoms.  RESPIRATORY:  Denies any cough or hemoptysis.  She does get shortness of   breath on mild-to-moderate exertion, chest pain.  She had some chest tightness   in her chest on exertion, which appears to have resolved.  She denies any    orthopnea.  GASTROINTESTINAL:  No nausea, vomiting, diarrhea, constipation, hematemesis,   melena, or hematochezia.  GENITOURINARY:  She is postmenopausal and denies any hematuria.  MUSCULOSKELETAL:  She has chronic joint and back pains which have not changed   in the recent past.  NEUROLOGICAL:  Denies any seizures or any stroke or any weakness on any side.  PSYCHIATRIC:  She is anxious, but denies any depression.  SKIN AND INTEGUMENTARY:  No rash or any bruising.    PHYSICAL EXAMINATION:  GENERAL:  The patient is alert and oriented x3, lying comfortably in the bed,   not in any distress.  VITAL SIGNS:  Temperature 37.4, pulse 94, respiration 18, /62.  HEENT:  Pupils are equal.  She is not icteric.  Oral mucosa is unremarkable.    Oropharynx appears normal.  NECK:  Reveals no lymphadenopathy or any JVD.  There is no thyromegaly.  LUNGS:  Clear to auscultation with good bilateral air entry.  HEART:  Reveals a II/VI systolic murmur.  She appears to be in sinus rhythm.  ABDOMEN:  Again reveals no hepatosplenomegaly.  LYMPHATIC:  There is no lymphadenopathy in any part of her body.  BACK:  Reveals mild kyphosis, but no scoliosis.  SKIN:  Reveals no rash or any bruising.  NEUROLOGIC:  Reveals all the cranial nerves are intact and there is no   peripheral neuropathy.  PSYCHIATRIC:  Evaluation is unremarkable.    ASSESSMENT AND PLAN:  Severe anemia.  The patient has fairly severe anemia.    Labs done today morning showed a hemoglobin and hematocrit of 8.4/26.3 with   MCV of 108.7 with a platelet count of 18,000.  White cell count was elevated   to 31,800 with 15% neutrophils and 81% lymphocytes giving her an absolute   lymphocyte count of 25,820.  Her reticulocyte count is elevated to 5.5%.    Shaheen test is positive (anti-IgG, negative for anti C3d).    ASSESSMENT AND PLAN:  Anemia.  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  Shaheen test is positive.  Haptoglobin    is still pending.  LDH is also still pending.  The hepatitis panel is   negative and she is negative for HIV.  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  A bone marrow biopsy has already been set up   for tomorrow.  Further management will depend upon the results of these tests.    If needed, I can put the patient on steroids.  I will also get an ultrasound   of her abdomen to get a better sense of her liver and spleen size.    Currently, clinically I did not discern any hepatosplenomegaly on this   patient.  She also does not have any B symptoms at this time.  I explained to   the patient the various forms of lymphoproliferative disorders.  I will   continue to follow her up.       ____________________________________     TEJVIR MD BEENA SAENZ / KIP    DD:  12/21/2017 16:32:56  DT:  12/21/2017 17:27:52    D#:  [id number]  Job#:  [id number]

## 2017-12-22 NOTE — PROCEDURES
DATE OF PROCEDURE: 12/22/2017    PROCEDURE: Bone marrow biopsy with bone marrow aspiration.     REQUESTING PHYSICIAN: Dr. Martinez    INDICATIONS: leukocytosis    INFORMED CONSENT: Consent signed and on the chart.     DESCRIPTION: A time out was called. The patient was placed in the prone position. The left buttock was prepped and draped in a sterile fashion.  1 mL of 1% lidocaine was injected into the skin followed by 3 mL into the periosteum of the posterior ilium bone. Large-bore needle was used to obtain 5 mL of aspirate x 6 followed by 5 mL   into a heparinized syringe for flow cytometry. This followed by a  bone marrow biopsy using the Jamshidi needle x 4. Despite going in both sides for a total of 6 aspirations, there were effectively no spicules.    SEDATION USED:  3 Mg versed and 75 Mcg fentanyl.     ESTIMATED BLOOD LOSS: 1 ml

## 2017-12-22 NOTE — PROGRESS NOTES
Pt continues to rest in bed, no complaints of pain or any signs of distress noted at this time. Will continue to monitor.

## 2017-12-22 NOTE — CARE PLAN
Problem: Safety  Goal: Will remain free from injury  Outcome: PROGRESSING AS EXPECTED  Pt up with standby assistance. Calls appropriately for assistance prior to getting OOB. Gait steady.     Problem: Infection  Goal: Will remain free from infection  Outcome: PROGRESSING AS EXPECTED  No s/s of infection.   Intervention: Assess signs and symptoms of infection  No fever, WBC count elevated, MD aware. Plan for bone marrow biopsy tomorrow.

## 2017-12-22 NOTE — CARE PLAN
Problem: Communication  Goal: The ability to communicate needs accurately and effectively will improve  Outcome: PROGRESSING AS EXPECTED  Pt is encouraged to ask questions, and is able to communicate concerns and/or needs effectively.    Problem: Safety  Goal: Will remain free from falls  Outcome: PROGRESSING AS EXPECTED  Pt educated to call for assistance prior to getting up. Pt verbalizes understanding and calls appropriately.

## 2017-12-22 NOTE — PROGRESS NOTES
Internal Medicine Interval Note  Note Author: Gumaro Arizmendi M.D.     Name Puja Briscoe     1945   Age/Sex 72 y.o. female   MRN [id number]   Code Status Full code     After 5PM or if no immediate response to page, please call for cross-coverage  Attending/Team: Dr. Arenas/ Castro See Patient List for primary contact information  Call [phone] to page    1st Call - Day Intern (R1):   Dr. Arizmendi 2nd Call - Day Sr. Resident (R2/R3):   Dr. Antonio         Reason for interval visit  (Principal Problem)   Leukocytosis   Anemia  Thrombocytopenia    Interval Problem Daily Status Update  (24 hours)   Patient has received 2 units of PRBCs. Hemoglobin post transfusion- 8.4  Heme/onc consulted. Patient will have a bone marrow biopsy tomorrow. F/u further recs  Watch for acute bleeding    Review of Systems   Constitutional: Positive for malaise/fatigue and weight loss. Negative for chills, diaphoresis and fever.   HENT: Negative for hearing loss, sinus pain and sore throat.    Eyes: Negative for blurred vision, double vision and photophobia.   Respiratory: Positive for cough, sputum production and shortness of breath. Negative for hemoptysis and stridor.    Cardiovascular: Positive for palpitations. Negative for chest pain and leg swelling.   Gastrointestinal: Negative for abdominal pain, constipation, diarrhea, heartburn, nausea and vomiting.   Genitourinary: Negative for dysuria, frequency and urgency.   Musculoskeletal: Negative for back pain, joint pain, myalgias and neck pain.   Skin: Negative for itching and rash.   Neurological: Negative for dizziness, tremors, sensory change, speech change and focal weakness.   Endo/Heme/Allergies: Does not bruise/bleed easily.   Psychiatric/Behavioral: Negative for depression, hallucinations and substance abuse. The patient is not nervous/anxious.        Consultants/Specialty  Heme/onc    Disposition  Inpatient     Quality Measures    Reviewed items::  Labs reviewed,  Medications reviewed and Radiology images reviewed  Ngo catheter::  No Ngo  DVT prophylaxis pharmacological::  Contraindicated - Anemia requiring blood transfusion  DVT prophylaxis - mechanical:  SCDs  Ulcer Prophylaxis::  Not indicated          Physical Exam       Vitals:    12/21/17 0743 12/21/17 1000 12/21/17 1015 12/21/17 1200   BP: 128/65 129/61 127/56 121/62   Pulse: 82 (!) 109 (!) 109 94   Resp: 18 16 16 18   Temp: 36.9 °C (98.4 °F) 37.9 °C (100.3 °F) 37.3 °C (99.2 °F) 37.4 °C (99.3 °F)   TempSrc:       SpO2: 97% 94% 92% 95%   Weight:       Height:         Body mass index is 20.69 kg/m². Weight: 56.4 kg (124 lb 5.4 oz)  Oxygen Therapy:  Pulse Oximetry: 95 %, O2 (LPM): 0, O2 Delivery: None (Room Air)    Physical Exam   Constitutional: She is oriented to person, place, and time and well-developed, well-nourished, and in no distress.   HENT:   Head: Normocephalic and atraumatic.   Mouth/Throat: No oropharyngeal exudate.   Eyes: EOM are normal. Pupils are equal, round, and reactive to light. No scleral icterus.   Neck: Normal range of motion. Neck supple.   Cardiovascular: Regular rhythm and normal heart sounds.    No murmur heard.  Tachycardic   Pulmonary/Chest: Effort normal and breath sounds normal. No respiratory distress. She has no wheezes. She has no rales.   Abdominal: Soft. Bowel sounds are normal. She exhibits no distension and no mass. There is no tenderness. There is no rebound.   Musculoskeletal: She exhibits no edema.   Neurological: She is alert and oriented to person, place, and time. No cranial nerve deficit.   Skin: Skin is warm and dry. No rash noted. No erythema.   Psychiatric: Mood, memory, affect and judgment normal.         Lab Data Review:         12/21/2017  5:23 PM    Recent Labs      12/20/17   1722  12/21/17   0316   SODIUM  138  139   POTASSIUM  4.1  3.7   CHLORIDE  105  108   CO2  23  24   BUN  12  11   CREATININE  0.70  0.62   CALCIUM  9.3  8.8       Recent Labs      12/20/17    1722  12/21/17   0316   ALTSGPT  13  10   ASTSGOT  20  16   ALKPHOSPHAT  80  62   TBILIRUBIN  1.1  1.8*   LIPASE  22   --    GLUCOSE  101*  94       Recent Labs      12/20/17   1722  12/21/17   0803  12/21/17   1359   RBC  1.74*  1.78*  2.42*   HEMOGLOBIN  6.3*  6.2*  8.4*   HEMATOCRIT  21.1*  20.4*  26.3*   PLATELETCT  22*  17*  18*   PROTHROMBTM  13.5   --    --    APTT  22.6*   --    --    INR  1.06   --    --        Recent Labs      12/20/17   1722  12/21/17 0316  12/21/17   0803  12/21/17   1359   WBC  45.5*   --   30.2*  31.8*   NEUTSPOLYS  13.00*   --   12.20*  15.20*   LYMPHOCYTES  73.00*   --   78.20*  81.20*   MONOCYTES  3.50   --   3.50  1.80   EOSINOPHILS  0.00   --   0.00  0.90   BASOPHILS  0.00   --   0.00  0.00   ASTSGOT  20  16   --    --    ALTSGPT  13  10   --    --    ALKPHOSPHAT  80  62   --    --    TBILIRUBIN  1.1  1.8*   --    --            Assessment/Plan     * Leukocytosis- (present on admission)   Assessment & Plan    Leukocytosis of 45 with lymphocytic predominance. No obvious source of infection. Does endorse a cough, however appears to be chronic. Previous CTs have noted chronic lung scarring. CXR done today was negative for infectious process. U/A was abnormal with moderate leuk esterase and WBC 10-20 however patient denied any urinary symptoms.  - Blood cultures- NGTD  - Peripheral smear shows few reactive lymphocytes and smudge cells.  - Hem/ onc consulted. Patient will have bone marrow biopsy tomorrow.          Anemia- (present on admission)   Assessment & Plan    72 year old woman with no major medical problems in the past presents with shortness of breath and fatigue.  She was found to be anemic, thrombocytopenic with leukocytosis. Denied any active bleeding. Troponin and EKG negative for features of ACS/MI. SOB and chest tightness most likely related to her anemia. Fatigue may also be related to anemia however will do additional work-up. FOBT done by ERP was negative. She  received 1 unit of blood in ED. Last colonoscopy was about 10 years, negative.  - Hemoglobin post transfusion- 6.2. 1 more unit of PRBC transfused. Repeat hemoglobin-8.4  - Anemia most likely secondary to bone marrow suppression given additional lab findings of leukocytosis and thrombocytopenia  - Anemia workup pending  - Transfuse for hb <7        Thrombocytopenia (CMS-HCC)- (present on admission)   Assessment & Plan    - No pharmacological DVT prophylaxis given the anemia and thrombocytopenia. SCDs+  - HIV, hepatitis panel negative  - Patient will have bone marrow biopsy tomorrow  - Watch for acute bleeding   - F/u hem/onc recommendations

## 2017-12-22 NOTE — CARE PLAN
Problem: Safety  Goal: Will remain free from falls  Outcome: PROGRESSING AS EXPECTED  Patient understands purposeful hourly rounding and addressing the 4 P's - pain, potty, position, and placement.    Problem: Infection  Goal: Will remain free from infection  Outcome: PROGRESSING AS EXPECTED  Patient will demonstrate proper hand hygiene.

## 2017-12-22 NOTE — PROGRESS NOTES
Received report from day shift nurse. Pt is currently resting in bed, family at bedside. Pt does not have any complaints of pain, or any signs of distress noted at this time. Bed in lowest position. Call light within reach. Will continue to monitor.

## 2017-12-22 NOTE — PROGRESS NOTES
Received bedside shift report from night RNSamm.  Pt is AOx4.  Discussed POC and goal for the day with patient and she verbalized understanding.  Eduated pt on white board and call light.  Bed locked and in lowest position with bilateral bedside rails up.  Will resume care and continue to monitor.

## 2017-12-23 LAB
ALBUMIN SERPL BCP-MCNC: 3.9 G/DL (ref 3.2–4.9)
ALBUMIN/GLOB SERPL: 1.8 G/DL
ALP SERPL-CCNC: 62 U/L (ref 30–99)
ALT SERPL-CCNC: 12 U/L (ref 2–50)
ANION GAP SERPL CALC-SCNC: 9 MMOL/L (ref 0–11.9)
ANISOCYTOSIS BLD QL SMEAR: ABNORMAL
APTT PPP: 23.7 SEC (ref 24.7–36)
AST SERPL-CCNC: 21 U/L (ref 12–45)
BASOPHILS # BLD AUTO: 0 % (ref 0–1.8)
BASOPHILS # BLD: 0 K/UL (ref 0–0.12)
BILIRUB SERPL-MCNC: 1.3 MG/DL (ref 0.1–1.5)
BUN SERPL-MCNC: 14 MG/DL (ref 8–22)
CALCIUM SERPL-MCNC: 9.2 MG/DL (ref 8.5–10.5)
CHLORIDE SERPL-SCNC: 109 MMOL/L (ref 96–112)
CMV IGG SERPL IA-ACNC: 3.2 U/ML
CMV IGM SERPL IA-ACNC: <8 AU/ML
CO2 SERPL-SCNC: 20 MMOL/L (ref 20–33)
CREAT SERPL-MCNC: 0.64 MG/DL (ref 0.5–1.4)
EBV EA-D IGG SER-ACNC: 11.3 U/ML (ref 0–10.9)
EBV NA IGG SER IA-ACNC: 13.7 U/ML (ref 0–21.9)
EBV VCA IGG SER IA-ACNC: 95.7 U/ML (ref 0–21.9)
EOSINOPHIL # BLD AUTO: 0 K/UL (ref 0–0.51)
EOSINOPHIL NFR BLD: 0 % (ref 0–6.9)
ERYTHROCYTE [DISTWIDTH] IN BLOOD BY AUTOMATED COUNT: 83.7 FL (ref 35.9–50)
FERRITIN SERPL-MCNC: 240.5 NG/ML (ref 10–291)
FOLATE SERPL-MCNC: >23.4 NG/ML
GFR SERPL CREATININE-BSD FRML MDRD: >60 ML/MIN/1.73 M 2
GLOBULIN SER CALC-MCNC: 2.2 G/DL (ref 1.9–3.5)
GLUCOSE SERPL-MCNC: 169 MG/DL (ref 65–99)
HAPTOGLOB SERPL-MCNC: 128 MG/DL (ref 30–200)
HCT VFR BLD AUTO: 25.8 % (ref 37–47)
HGB BLD-MCNC: 7.9 G/DL (ref 12–16)
INR PPP: 1.14 (ref 0.87–1.13)
IRON SATN MFR SERPL: 17 % (ref 15–55)
IRON SERPL-MCNC: 64 UG/DL (ref 40–170)
LYMPHOCYTES # BLD AUTO: 47.83 K/UL (ref 1–4.8)
LYMPHOCYTES NFR BLD: 85.1 % (ref 22–41)
MANUAL DIFF BLD: NORMAL
MCH RBC QN AUTO: 34.3 PG (ref 27–33)
MCHC RBC AUTO-ENTMCNC: 30.6 G/DL (ref 33.6–35)
MCV RBC AUTO: 112.2 FL (ref 81.4–97.8)
METAMYELOCYTES NFR BLD MANUAL: 1.8 %
MICROCYTES BLD QL SMEAR: ABNORMAL
MONOCYTES # BLD AUTO: 0.51 K/UL (ref 0–0.85)
MONOCYTES NFR BLD AUTO: 0.9 % (ref 0–13.4)
MYELOCYTES NFR BLD MANUAL: 1.7 %
NEUTROPHILS # BLD AUTO: 1.97 K/UL (ref 2–7.15)
NEUTROPHILS NFR BLD: 3.5 % (ref 44–72)
NEUTS BAND NFR BLD MANUAL: 7 % (ref 0–10)
NRBC # BLD AUTO: 0.24 K/UL
NRBC BLD-RTO: 0.4 /100 WBC
OVALOCYTES BLD QL SMEAR: NORMAL
PLATELET # BLD AUTO: 15 K/UL (ref 164–446)
PLATELET BLD QL SMEAR: NORMAL
PMV BLD AUTO: 11.7 FL (ref 9–12.9)
POLYCHROMASIA BLD QL SMEAR: NORMAL
POTASSIUM SERPL-SCNC: 4.3 MMOL/L (ref 3.6–5.5)
PROT SERPL-MCNC: 6.1 G/DL (ref 6–8.2)
PROTHROMBIN TIME: 14.3 SEC (ref 12–14.6)
RBC # BLD AUTO: 2.3 M/UL (ref 4.2–5.4)
RBC BLD AUTO: PRESENT
SODIUM SERPL-SCNC: 138 MMOL/L (ref 135–145)
TIBC SERPL-MCNC: 379 UG/DL (ref 250–450)
TSH SERPL DL<=0.005 MIU/L-ACNC: 0.43 UIU/ML (ref 0.38–5.33)
VIT B12 SERPL-MCNC: 364 PG/ML (ref 211–911)
WBC # BLD AUTO: 56.2 K/UL (ref 4.8–10.8)

## 2017-12-23 PROCEDURE — 82607 VITAMIN B-12: CPT

## 2017-12-23 PROCEDURE — 83540 ASSAY OF IRON: CPT

## 2017-12-23 PROCEDURE — 82746 ASSAY OF FOLIC ACID SERUM: CPT

## 2017-12-23 PROCEDURE — 85730 THROMBOPLASTIN TIME PARTIAL: CPT

## 2017-12-23 PROCEDURE — 83550 IRON BINDING TEST: CPT

## 2017-12-23 PROCEDURE — 770020 HCHG ROOM/CARE - TELE (206)

## 2017-12-23 PROCEDURE — 85610 PROTHROMBIN TIME: CPT

## 2017-12-23 PROCEDURE — 700111 HCHG RX REV CODE 636 W/ 250 OVERRIDE (IP): Performed by: STUDENT IN AN ORGANIZED HEALTH CARE EDUCATION/TRAINING PROGRAM

## 2017-12-23 PROCEDURE — 36415 COLL VENOUS BLD VENIPUNCTURE: CPT

## 2017-12-23 PROCEDURE — 84443 ASSAY THYROID STIM HORMONE: CPT

## 2017-12-23 PROCEDURE — 80053 COMPREHEN METABOLIC PANEL: CPT

## 2017-12-23 PROCEDURE — 99232 SBSQ HOSP IP/OBS MODERATE 35: CPT | Mod: GC | Performed by: INTERNAL MEDICINE

## 2017-12-23 PROCEDURE — 85027 COMPLETE CBC AUTOMATED: CPT

## 2017-12-23 PROCEDURE — 82728 ASSAY OF FERRITIN: CPT

## 2017-12-23 PROCEDURE — 85007 BL SMEAR W/DIFF WBC COUNT: CPT

## 2017-12-23 RX ADMIN — PREDNISONE 60 MG: 20 TABLET ORAL at 10:29

## 2017-12-23 ASSESSMENT — ENCOUNTER SYMPTOMS
DIZZINESS: 0
SHORTNESS OF BREATH: 0
HALLUCINATIONS: 0
HEMOPTYSIS: 0
COUGH: 1
TREMORS: 0
CONSTIPATION: 0
VOMITING: 0
WEIGHT LOSS: 0
INSOMNIA: 1
NERVOUS/ANXIOUS: 0
SENSORY CHANGE: 0
STRIDOR: 0
BACK PAIN: 0
FOCAL WEAKNESS: 0
PHOTOPHOBIA: 0
DEPRESSION: 0
DIARRHEA: 0
FEVER: 0
ABDOMINAL PAIN: 0
SPUTUM PRODUCTION: 0
PALPITATIONS: 0
NERVOUS/ANXIOUS: 1
SORE THROAT: 0
DOUBLE VISION: 0
CHILLS: 0
HEARTBURN: 0
DIAPHORESIS: 0
COUGH: 0
WEAKNESS: 0
BRUISES/BLEEDS EASILY: 0
NECK PAIN: 0
BLURRED VISION: 0
MYALGIAS: 0
NAUSEA: 0
SINUS PAIN: 0
SPEECH CHANGE: 0

## 2017-12-23 ASSESSMENT — PAIN SCALES - GENERAL
PAINLEVEL_OUTOF10: 0
PAINLEVEL_OUTOF10: 0

## 2017-12-23 ASSESSMENT — LIFESTYLE VARIABLES: SUBSTANCE_ABUSE: 0

## 2017-12-23 NOTE — PROGRESS NOTES
Received report from day shift nurse. Pt is alert and oriented, on room air. Pt has no complaints of pain and does not appear to be in any distress at this time. Bed in lowest position. Call light within reach. Will continue to monitor.

## 2017-12-23 NOTE — PROGRESS NOTES
I have personally evaluated and examined this patient and agree with the findings as documented in the resident note except as documented in this attending note.  I am actively involved in the patient's care.       S/p Bone Marrow biopsy 12/22,FU Results  Elevated wbc possible from steroids  Monitor cbc with diff  Watch for infections  FU Heme/Onc  rec  Watch for bleed/clots  Discussed with  RN    Resident note to follow

## 2017-12-23 NOTE — PROGRESS NOTES
Oncology/Hematology Progress Note               Author: Kalmarques Juan Date & Time created: 12/23/2017  12:39 PM   DX-LPD  AIHA  Interval History:  12/22/17-BM Bx well tolerated. Feels well. No bleeding.Pds started.  12/23/17-doing well. Had some insomnia. No CP or SOB    Review of Systems:  Review of Systems   Constitutional: Negative for fever.   Respiratory: Negative for cough, hemoptysis and sputum production.    Cardiovascular: Negative for chest pain, palpitations and leg swelling.   Gastrointestinal: Negative for heartburn, nausea and vomiting.   Genitourinary: Negative for dysuria and hematuria.   Musculoskeletal: Negative for back pain and myalgias.   Skin: Negative for itching and rash.   Neurological: Negative for weakness.   Psychiatric/Behavioral: Negative for depression. The patient is nervous/anxious and has insomnia.        Physical Exam:  Physical Exam   Constitutional: She is oriented to person, place, and time. She appears well-developed. No distress.   Neck: Thyromegaly present.   Cardiovascular: Normal rate.  Exam reveals no gallop.    No murmur heard.  Pulmonary/Chest: Effort normal and breath sounds normal. No respiratory distress. She has no wheezes. She has no rales.   Musculoskeletal: She exhibits no edema or tenderness.   Lymphadenopathy:     She has no cervical adenopathy.   Neurological: She is alert and oriented to person, place, and time. No cranial nerve deficit. Coordination normal.   Skin: Skin is warm and dry. She is not diaphoretic. No erythema.   Psychiatric: She has a normal mood and affect. Her behavior is normal.       Labs:        Invalid input(s): [id number]  Recent Labs      12/20/17   1722   TROPONINI  <0.01   BNPBTYPENAT  87     Recent Labs      12/21/17   0316  12/22/17   0353  12/23/17   0209   SODIUM  139  141  138   POTASSIUM  3.7  3.7  4.3   CHLORIDE  108  115*  109   CO2  24  19*  20   BUN  11  8  14   CREATININE  0.62  0.48*  0.64   CALCIUM  8.8  8.1*  9.2      Recent Labs      17   ALTSGPT  13  10  8  12   ASTSGOT  20  16  15  21   ALKPHOSPHAT  80  62  54  62   TBILIRUBIN  1.1  1.8*  1.2  1.3   LIPASE  22   --    --    --    GLUCOSE  101*  94  88  169*     Recent Labs      17   RBC  1.74*   < >  2.42*  2.03*  2.30*   HEMOGLOBIN  6.3*   < >  8.4*  7.1*  7.9*   HEMATOCRIT  21.1*   < >  26.3*  22.5*  25.8*   PLATELETCT  22*   < >  18*  16*  15*   PROTHROMBTM  13.5   --    --    --   14.3   APTT  22.6*   --    --    --   23.7*   INR  1.06   --    --    --   1.14*   IRON   --    --    --    --   64   FERRITIN   --    --    --    --   240.5   TOTIRONBC   --    --    --    --   379    < > = values in this interval not displayed.     Recent Labs      17   WBC   --    < >  31.8*  32.1*  56.2*   NEUTSPOLYS   --    < >  15.20*  14.90*  3.50*   LYMPHOCYTES   --    < >  81.20*  75.40*  85.10*   MONOCYTES   --    < >  1.80  3.50  0.90   EOSINOPHILS   --    < >  0.90  1.70  0.00   BASOPHILS   --    < >  0.00  0.00  0.00   ASTSGOT  16   --    --   15  21   ALTSGPT  10   --    --   8  12   ALKPHOSPHAT  62   --    --   54  62   TBILIRUBIN  1.8*   --    --   1.2  1.3    < > = values in this interval not displayed.     Recent Labs      17   SODIUM  139  141  138   POTASSIUM  3.7  3.7  4.3   CHLORIDE  108  115*  109   CO2  24  19*  20   GLUCOSE  94  88  169*   BUN  11  8  14   CREATININE  0.62  0.48*  0.64   CALCIUM  8.8  8.1*  9.2     Hemodynamics:  Temp (24hrs), Av.8 °C (98.3 °F), Min:36 °C (96.8 °F), Max:37.7 °C (99.8 °F)  Temperature: 36 °C (96.8 °F)  Pulse  Av.4  Min: 77  Max: 128Heart Rate (Monitored): 97  Blood Pressure : 106/51, NIBP: 142/47     Respiratory:    Respiration: 18, Pulse Oximetry: 96 %     Work Of Breathing / Effort:  Mild  RUL Breath Sounds: Clear, RML Breath Sounds: Clear, RLL Breath Sounds: Diminished, RICHARD Breath Sounds: Clear, LLL Breath Sounds: Diminished  Fluids:    Intake/Output Summary (Last 24 hours) at 12/22/17 1801  Last data filed at 12/22/17 0600   Gross per 24 hour   Intake             1740 ml   Output              900 ml   Net              840 ml     Weight: 63.3 kg (139 lb 8.8 oz)  GI/Nutrition:  Orders Placed This Encounter   Procedures   • DIET ORDER     Standing Status:   Standing     Number of Occurrences:   1     Order Specific Question:   Diet:     Answer:   Regular [1]     Medical Decision Making, by Problem:  Active Hospital Problems    Diagnosis   • *Leukocytosis [D72.829]   • Anemia [D64.9]   • Thrombocytopenia (CMS-HCC) [D69.6]       Plan:  LPD-Likely LPD?CLL.Await BM Bx RESULTS  [facility]-12/22/17-Will start on steroids. Started on PDS.D/W Dr Arizmendi.  12/23/17-continue Pds . Well tolerated so far. H/H is accetable.    Quality-Core Measures

## 2017-12-23 NOTE — CARE PLAN
Problem: Communication  Goal: The ability to communicate needs accurately and effectively will improve  Outcome: PROGRESSING AS EXPECTED  Pt encouraged and is able to communicate questions and/or concerns effectively.     Problem: Respiratory:  Goal: Respiratory status will improve  Outcome: PROGRESSING AS EXPECTED  Pt is able to ambulate without complaints of SOB. Pt educated to call for assistance when needed.

## 2017-12-23 NOTE — PROGRESS NOTES
Internal Medicine Interval Note  Note Author: Gumaro Arizmendi M.D.     Name Puja Briscoe     1945   Age/Sex 72 y.o. female   MRN [id number]   Code Status Full code     After 5PM or if no immediate response to page, please call for cross-coverage  Attending/Team: Dr. Arenas/ Castro See Patient List for primary contact information  Call [phone] to page    1st Call - Day Intern (R1):   Dr. Arizmendi 2nd Call - Day Sr. Resident (R2/R3):   Dr. Antonio         Reason for interval visit  (Principal Problem)   Leukocytosis   Anemia  Thrombocytopenia    Interval Problem Daily Status Update  (24 hours)   - Heme/onc on board. Bone marrow biopsy on .   - WBCs increased to 56.2 today- likely secondary to steroids  - Transfuse for Hb<7, platelets <10,000 or active bleeding      Review of Systems   Constitutional: Positive for malaise/fatigue. Negative for chills, diaphoresis, fever and weight loss.   HENT: Negative for hearing loss, sinus pain and sore throat.    Eyes: Negative for blurred vision, double vision and photophobia.   Respiratory: Positive for cough. Negative for hemoptysis, sputum production, shortness of breath and stridor.    Cardiovascular: Negative for chest pain, palpitations and leg swelling.   Gastrointestinal: Negative for abdominal pain, constipation, diarrhea, heartburn, nausea and vomiting.   Genitourinary: Negative for dysuria, frequency and urgency.   Musculoskeletal: Negative for back pain, joint pain, myalgias and neck pain.   Skin: Negative for itching and rash.   Neurological: Negative for dizziness, tremors, sensory change, speech change and focal weakness.   Endo/Heme/Allergies: Does not bruise/bleed easily.   Psychiatric/Behavioral: Negative for depression, hallucinations and substance abuse. The patient is not nervous/anxious.        Consultants/Specialty  Heme/onc    Disposition  Inpatient     Quality Measures    Reviewed items::  Labs reviewed, Medications reviewed and  Radiology images reviewed  Ngo catheter::  No Ngo  DVT prophylaxis pharmacological::  Contraindicated - Anemia requiring blood transfusion  DVT prophylaxis - mechanical:  SCDs  Ulcer Prophylaxis::  Not indicated          Physical Exam       Vitals:    12/22/17 2000 12/23/17 0000 12/23/17 0400 12/23/17 0755   BP: 136/67 123/56 116/57 106/51   Pulse: (!) 105 85 81 77   Resp: 18 18 18 18   Temp: 37.3 °C (99.2 °F) 36.6 °C (97.8 °F) 36.7 °C (98.1 °F) 36 °C (96.8 °F)   TempSrc:       SpO2: 95% 94% 95% 96%   Weight: 63.3 kg (139 lb 8.8 oz)      Height:         Body mass index is 23.22 kg/m². Weight: 63.3 kg (139 lb 8.8 oz)  Oxygen Therapy:  Pulse Oximetry: 96 %, O2 (LPM): 0, O2 Delivery: None (Room Air)    Physical Exam   Constitutional: She is oriented to person, place, and time and well-developed, well-nourished, and in no distress.   HENT:   Head: Normocephalic and atraumatic.   Mouth/Throat: No oropharyngeal exudate.   Eyes: EOM are normal. Pupils are equal, round, and reactive to light. No scleral icterus.   Neck: Normal range of motion. Neck supple.   Cardiovascular: Normal rate, regular rhythm and normal heart sounds.    No murmur heard.  Pulmonary/Chest: Effort normal and breath sounds normal. No respiratory distress. She has no wheezes. She has no rales.   Abdominal: Soft. Bowel sounds are normal. She exhibits no distension and no mass. There is no tenderness. There is no rebound.   Musculoskeletal: She exhibits no edema.   Neurological: She is alert and oriented to person, place, and time. No cranial nerve deficit.   Skin: Skin is warm and dry. No rash noted. No erythema.   Psychiatric: Mood, memory, affect and judgment normal.         Lab Data Review:         12/21/2017  5:23 PM    Recent Labs      12/21/17   0316  12/22/17   0353  12/23/17   0209   SODIUM  139  141  138   POTASSIUM  3.7  3.7  4.3   CHLORIDE  108  115*  109   CO2  24  19*  20   BUN  11  8  14   CREATININE  0.62  0.48*  0.64   CALCIUM  8.8   8.1*  9.2       Recent Labs      12/20/17   1722  12/21/17   0316  12/22/17   0353  12/23/17   0209   ALTSGPT  13  10  8  12   ASTSGOT  20  16  15  21   ALKPHOSPHAT  80  62  54  62   TBILIRUBIN  1.1  1.8*  1.2  1.3   LIPASE  22   --    --    --    GLUCOSE  101*  94  88  169*       Recent Labs      12/20/17   1722   12/21/17   1359  12/22/17 0353  12/23/17   0209   RBC  1.74*   < >  2.42*  2.03*  2.30*   HEMOGLOBIN  6.3*   < >  8.4*  7.1*  7.9*   HEMATOCRIT  21.1*   < >  26.3*  22.5*  25.8*   PLATELETCT  22*   < >  18*  16*  15*   PROTHROMBTM  13.5   --    --    --   14.3   APTT  22.6*   --    --    --   23.7*   INR  1.06   --    --    --   1.14*   IRON   --    --    --    --   64   FERRITIN   --    --    --    --   240.5   TOTIRONBC   --    --    --    --   379    < > = values in this interval not displayed.       Recent Labs      12/21/17 0316 12/21/17 1359 12/22/17 0353 12/23/17   0209   WBC   --    < >  31.8*  32.1*  56.2*   NEUTSPOLYS   --    < >  15.20*  14.90*  3.50*   LYMPHOCYTES   --    < >  81.20*  75.40*  85.10*   MONOCYTES   --    < >  1.80  3.50  0.90   EOSINOPHILS   --    < >  0.90  1.70  0.00   BASOPHILS   --    < >  0.00  0.00  0.00   ASTSGOT  16   --    --   15  21   ALTSGPT  10   --    --   8  12   ALKPHOSPHAT  62   --    --   54  62   TBILIRUBIN  1.8*   --    --   1.2  1.3    < > = values in this interval not displayed.           Assessment/Plan     * Leukocytosis- (present on admission)   Assessment & Plan    Leukocytosis of 45 with lymphocytic predominance on admission.  - Most likely secondary to lymphoproliferative disorder  - abdominal ultrasound shows hepatosplenomegaly  - Blood cultures- NGTD  - Increased leukocytosis today most likely secondary to prednisone  - Heme/onc on board. Patient had a bone marrow biopsy today- f/u results          Anemia- (present on admission)   Assessment & Plan    72 year old woman presented with 1 month of shortness of breath and fatigue. Hb 6.3 on  presentation along with leukocytosis and thrombocytopenia. S/p 2 units PRBCs  - Macrocytic anemia- normal Vitamin B12, folate and TSH. Iron on the lower side of normal, but otherwise normal iron studies  - Retic index 2.6 indicating adequate reticulocyte response. 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 She had a bone marrow biopsy yesterday  Plan:  - Continue prednisone  - f/u results of bone marrow  - Transfuse for hb <7        Thrombocytopenia (CMS-HCC)- (present on admission)   Assessment & Plan    - No pharmacological DVT prophylaxis given the anemia and thrombocytopenia. SCDs+  - HIV, hepatitis  c negative  - Watch for acute bleeding   - Transfuse for platelets <10,000 or active bleeding

## 2017-12-23 NOTE — PROGRESS NOTES
Internal Medicine Interval Note  Note Author: Gumaro Arizmendi M.D.     Name Puja Briscoe     1945   Age/Sex 72 y.o. female   MRN [id number]   Code Status Full code     After 5PM or if no immediate response to page, please call for cross-coverage  Attending/Team: Dr. Arenas/ Castro See Patient List for primary contact information  Call [phone] to page    1st Call - Day Intern (R1):   Dr. Arizmendi 2nd Call - Day Sr. Resident (R2/R3):   Dr. Antonio         Reason for interval visit  (Principal Problem)   Leukocytosis   Anemia  Thrombocytopenia    Interval Problem Daily Status Update  (24 hours)   - Heme/onc on board. Patient had a bone marrow biopsy done today. 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  - Transfuse for Hb<7, platelets <10,000 or active bleeding      Review of Systems   Constitutional: Positive for malaise/fatigue and weight loss. Negative for chills, diaphoresis and fever.   HENT: Negative for hearing loss, sinus pain and sore throat.    Eyes: Negative for blurred vision, double vision and photophobia.   Respiratory: Positive for cough, sputum production and shortness of breath. Negative for hemoptysis and stridor.    Cardiovascular: Negative for chest pain, palpitations and leg swelling.   Gastrointestinal: Negative for abdominal pain, constipation, diarrhea, heartburn, nausea and vomiting.   Genitourinary: Negative for dysuria, frequency and urgency.   Musculoskeletal: Negative for back pain, joint pain, myalgias and neck pain.   Skin: Negative for itching and rash.   Neurological: Negative for dizziness, tremors, sensory change, speech change and focal weakness.   Endo/Heme/Allergies: Does not bruise/bleed easily.   Psychiatric/Behavioral: Negative for depression, hallucinations and substance abuse. The patient is not nervous/anxious.        Consultants/Specialty  Heme/onc    Disposition  Inpatient     Quality Measures    Reviewed items::  Labs reviewed,  Medications reviewed and Radiology images reviewed  Ngo catheter::  No Ngo  DVT prophylaxis pharmacological::  Contraindicated - Anemia requiring blood transfusion  DVT prophylaxis - mechanical:  SCDs  Ulcer Prophylaxis::  Not indicated          Physical Exam       Vitals:    12/22/17 1239 12/22/17 1244 12/22/17 1249 12/22/17 1254   BP:       Pulse: 90 90 87 97   Resp: 20 18 20 20   Temp:       TempSrc:       SpO2: 98% 98% 99% 99%   Weight:       Height:         Body mass index is 20.73 kg/m². Weight: 56.5 kg (124 lb 9 oz)  Oxygen Therapy:  Pulse Oximetry: 99 %, O2 (LPM): 3, O2 Delivery: Nasal Cannula    Physical Exam   Constitutional: She is oriented to person, place, and time and well-developed, well-nourished, and in no distress.   HENT:   Head: Normocephalic and atraumatic.   Mouth/Throat: No oropharyngeal exudate.   Eyes: EOM are normal. Pupils are equal, round, and reactive to light. No scleral icterus.   Neck: Normal range of motion. Neck supple.   Cardiovascular: Normal rate, regular rhythm and normal heart sounds.    No murmur heard.  Pulmonary/Chest: Effort normal and breath sounds normal. No respiratory distress. She has no wheezes. She has no rales.   Abdominal: Soft. Bowel sounds are normal. She exhibits no distension and no mass. There is no tenderness. There is no rebound.   Musculoskeletal: She exhibits no edema.   Neurological: She is alert and oriented to person, place, and time. No cranial nerve deficit.   Skin: Skin is warm and dry. No rash noted. No erythema.   Psychiatric: Mood, memory, affect and judgment normal.         Lab Data Review:         12/21/2017  5:23 PM    Recent Labs      12/20/17   1722  12/21/17   0316  12/22/17   0353   SODIUM  138  139  141   POTASSIUM  4.1  3.7  3.7   CHLORIDE  105  108  115*   CO2  23  24  19*   BUN  12  11  8   CREATININE  0.70  0.62  0.48*   CALCIUM  9.3  8.8  8.1*       Recent Labs      12/20/17   1722  12/21/17   0316  12/22/17   0353   ALTSGPT  13   10  8   ASTSGOT  20  16  15   ALKPHOSPHAT  80  62  54   TBILIRUBIN  1.1  1.8*  1.2   LIPASE  22   --    --    GLUCOSE  101*  94  88       Recent Labs      12/20/17   1722  12/21/17   0803  12/21/17   1359  12/22/17   0353   RBC  1.74*  1.78*  2.42*  2.03*   HEMOGLOBIN  6.3*  6.2*  8.4*  7.1*   HEMATOCRIT  21.1*  20.4*  26.3*  22.5*   PLATELETCT  22*  17*  18*  16*   PROTHROMBTM  13.5   --    --    --    APTT  22.6*   --    --    --    INR  1.06   --    --    --        Recent Labs      12/20/17   1722  12/21/17   0316  12/21/17   0803  12/21/17   1359  12/22/17   0353   WBC  45.5*   --   30.2*  31.8*  32.1*   NEUTSPOLYS  13.00*   --   12.20*  15.20*  14.90*   LYMPHOCYTES  73.00*   --   78.20*  81.20*  75.40*   MONOCYTES  3.50   --   3.50  1.80  3.50   EOSINOPHILS  0.00   --   0.00  0.90  1.70   BASOPHILS  0.00   --   0.00  0.00  0.00   ASTSGOT  20  16   --    --   15   ALTSGPT  13  10   --    --   8   ALKPHOSPHAT  80  62   --    --   54   TBILIRUBIN  1.1  1.8*   --    --   1.2           Assessment/Plan     * Leukocytosis- (present on admission)   Assessment & Plan    Leukocytosis of 45 with lymphocytic predominance on admission. No obvious source of infection. Does endorse a cough, however appears to be chronic. Previous CTs have noted chronic lung scarring. CXR done today was negative for infectious process. U/A was abnormal with moderate leuk esterase and WBC 10-20 however patient denied any urinary symptoms.  - abdominal ultrasound shows hepatosplenomegaly  - Blood cultures- NGTD  - Heme/onc on board. Patient had a bone marrow biopsy today- f/u results          Anemia- (present on admission)   Assessment & Plan    72 year old woman with no major medical problems in the past presented with shortness of breath and fatigue.  She was found to be anemic, thrombocytopenic with leukocytosis. Denied any active bleeding. Troponin and EKG negative for features of ACS/MI. SOB and chest tightness most likely related to her  anemia. Fatigue may also be related to anemia however will do additional work-up. FOBT done by ERP was negative. She received 1 unit of blood in ED. Last colonoscopy was about 10 years, negative.  - Hemoglobin this morning-7.1. Retic index 2.6 indicating adequate reticulocyte response. Shaheen test positive- patient started on prednisone   - Patient underwent bone marrow biopsy today- f/u pathology results  - Anemia workup pending  - Transfuse for hb <7        Thrombocytopenia (CMS-HCC)- (present on admission)   Assessment & Plan    - No pharmacological DVT prophylaxis given the anemia and thrombocytopenia. SCDs+  - HIV, hepatitis panel negative  - Watch for acute bleeding   - Transfuse for platelets <10,000 or active bleeding

## 2017-12-24 LAB
ABO GROUP BLD: NORMAL
ANION GAP SERPL CALC-SCNC: 7 MMOL/L (ref 0–11.9)
ANISOCYTOSIS BLD QL SMEAR: ABNORMAL
BARCODED ABORH UBTYP: 7300
BARCODED PRD CODE UBPRD: NORMAL
BARCODED UNIT NUM UBUNT: NORMAL
BASOPHILS # BLD AUTO: 0 % (ref 0–1.8)
BASOPHILS # BLD: 0 K/UL (ref 0–0.12)
BLD GP AB SCN SERPL QL: NORMAL
BUN SERPL-MCNC: 16 MG/DL (ref 8–22)
CALCIUM SERPL-MCNC: 9.1 MG/DL (ref 8.5–10.5)
CHLORIDE SERPL-SCNC: 109 MMOL/L (ref 96–112)
CO2 SERPL-SCNC: 22 MMOL/L (ref 20–33)
COMPONENT R 8504R: NORMAL
CREAT SERPL-MCNC: 0.51 MG/DL (ref 0.5–1.4)
EOSINOPHIL # BLD AUTO: 0.3 K/UL (ref 0–0.51)
EOSINOPHIL NFR BLD: 0.9 % (ref 0–6.9)
ERYTHROCYTE [DISTWIDTH] IN BLOOD BY AUTOMATED COUNT: 81 FL (ref 35.9–50)
ERYTHROCYTE [DISTWIDTH] IN BLOOD BY AUTOMATED COUNT: 97.2 FL (ref 35.9–50)
GFR SERPL CREATININE-BSD FRML MDRD: >60 ML/MIN/1.73 M 2
GLUCOSE SERPL-MCNC: 140 MG/DL (ref 65–99)
HCT VFR BLD AUTO: 21.6 % (ref 37–47)
HCT VFR BLD AUTO: 29.6 % (ref 37–47)
HEMOCCULT STL QL: NEGATIVE
HGB BLD-MCNC: 6.7 G/DL (ref 12–16)
HGB BLD-MCNC: 9.3 G/DL (ref 12–16)
LYMPHOCYTES # BLD AUTO: 24.84 K/UL (ref 1–4.8)
LYMPHOCYTES NFR BLD: 73.7 % (ref 22–41)
MANUAL DIFF BLD: NORMAL
MCH RBC QN AUTO: 33.1 PG (ref 27–33)
MCH RBC QN AUTO: 34.5 PG (ref 27–33)
MCHC RBC AUTO-ENTMCNC: 31 G/DL (ref 33.6–35)
MCHC RBC AUTO-ENTMCNC: 31.4 G/DL (ref 33.6–35)
MCV RBC AUTO: 105.3 FL (ref 81.4–97.8)
MCV RBC AUTO: 111.3 FL (ref 81.4–97.8)
METAMYELOCYTES NFR BLD MANUAL: 2.6 %
MICROCYTES BLD QL SMEAR: ABNORMAL
MONOCYTES # BLD AUTO: 0.57 K/UL (ref 0–0.85)
MONOCYTES NFR BLD AUTO: 1.7 % (ref 0–13.4)
MORPHOLOGY BLD-IMP: NORMAL
MYELOCYTES NFR BLD MANUAL: 1.8 %
NEUTROPHILS # BLD AUTO: 6.5 K/UL (ref 2–7.15)
NEUTROPHILS NFR BLD: 17.5 % (ref 44–72)
NEUTS BAND NFR BLD MANUAL: 1.8 % (ref 0–10)
NRBC # BLD AUTO: 0.19 K/UL
NRBC BLD-RTO: 0.6 /100 WBC
OVALOCYTES BLD QL SMEAR: NORMAL
PLATELET # BLD AUTO: 16 K/UL (ref 164–446)
PLATELET # BLD AUTO: 19 K/UL (ref 164–446)
PLATELET BLD QL SMEAR: NORMAL
PLATELETS.RETICULATED NFR BLD AUTO: 11.9 K/UL (ref 0.6–13.1)
PMV BLD AUTO: 10.1 FL (ref 9–12.9)
PMV BLD AUTO: 10.9 FL (ref 9–12.9)
POIKILOCYTOSIS BLD QL SMEAR: NORMAL
POLYCHROMASIA BLD QL SMEAR: NORMAL
POTASSIUM SERPL-SCNC: 4.1 MMOL/L (ref 3.6–5.5)
PRODUCT TYPE UPROD: NORMAL
RBC # BLD AUTO: 1.94 M/UL (ref 4.2–5.4)
RBC # BLD AUTO: 2.81 M/UL (ref 4.2–5.4)
RBC BLD AUTO: PRESENT
RH BLD: NORMAL
SODIUM SERPL-SCNC: 138 MMOL/L (ref 135–145)
UNIT STATUS USTAT: NORMAL
WBC # BLD AUTO: 33.7 K/UL (ref 4.8–10.8)
WBC # BLD AUTO: 34.4 K/UL (ref 4.8–10.8)

## 2017-12-24 PROCEDURE — 36430 TRANSFUSION BLD/BLD COMPNT: CPT

## 2017-12-24 PROCEDURE — 86923 COMPATIBILITY TEST ELECTRIC: CPT

## 2017-12-24 PROCEDURE — 770020 HCHG ROOM/CARE - TELE (206)

## 2017-12-24 PROCEDURE — 80048 BASIC METABOLIC PNL TOTAL CA: CPT

## 2017-12-24 PROCEDURE — 99233 SBSQ HOSP IP/OBS HIGH 50: CPT | Mod: GC | Performed by: INTERNAL MEDICINE

## 2017-12-24 PROCEDURE — 82272 OCCULT BLD FECES 1-3 TESTS: CPT

## 2017-12-24 PROCEDURE — 86900 BLOOD TYPING SEROLOGIC ABO: CPT

## 2017-12-24 PROCEDURE — 85027 COMPLETE CBC AUTOMATED: CPT

## 2017-12-24 PROCEDURE — 85055 RETICULATED PLATELET ASSAY: CPT

## 2017-12-24 PROCEDURE — 36415 COLL VENOUS BLD VENIPUNCTURE: CPT

## 2017-12-24 PROCEDURE — 700111 HCHG RX REV CODE 636 W/ 250 OVERRIDE (IP): Performed by: STUDENT IN AN ORGANIZED HEALTH CARE EDUCATION/TRAINING PROGRAM

## 2017-12-24 PROCEDURE — 86850 RBC ANTIBODY SCREEN: CPT

## 2017-12-24 PROCEDURE — 85007 BL SMEAR W/DIFF WBC COUNT: CPT

## 2017-12-24 PROCEDURE — 86901 BLOOD TYPING SEROLOGIC RH(D): CPT

## 2017-12-24 PROCEDURE — P9016 RBC LEUKOCYTES REDUCED: HCPCS

## 2017-12-24 RX ORDER — SODIUM CHLORIDE 9 MG/ML
INJECTION, SOLUTION INTRAVENOUS
Status: ACTIVE
Start: 2017-12-24 | End: 2017-12-24

## 2017-12-24 RX ADMIN — PREDNISONE 60 MG: 20 TABLET ORAL at 09:07

## 2017-12-24 ASSESSMENT — PAIN SCALES - GENERAL
PAINLEVEL_OUTOF10: 0

## 2017-12-24 ASSESSMENT — ENCOUNTER SYMPTOMS
SORE THROAT: 0
NERVOUS/ANXIOUS: 1
BLURRED VISION: 0
INSOMNIA: 1
HEARTBURN: 0
NAUSEA: 0
DIZZINESS: 0
BRUISES/BLEEDS EASILY: 0
COUGH: 0
VOMITING: 0
FEVER: 0
HEMOPTYSIS: 0
ABDOMINAL PAIN: 0
BACK PAIN: 0
SHORTNESS OF BREATH: 0
MYALGIAS: 0
SPUTUM PRODUCTION: 0
PALPITATIONS: 0
DEPRESSION: 0
WEAKNESS: 0

## 2017-12-24 NOTE — PROGRESS NOTES
Bedside report with ANDIE Saleh   Patient sitting up in bed, resting. Plan of care discussed, needs met. Call bell and personal belongings within reach. Will continue to monitor.

## 2017-12-24 NOTE — CARE PLAN
Problem: Safety  Goal: Will remain free from falls    Intervention: Assess risk factors for falls   12/23/17 0700   OTHER   Fall Risk Risk to Fall - 0 - 1 point   Risk for Injury-Any positive answers results in the pt being at high risk for fall related injury Not Applicable   Mobility Status Assessment 0-Ambulates & Transfers Independently. No Assistance Required   History of fall 0   Pt Calls for Assistance Yes     Intervention: Implement fall precautions   12/23/17 0053   OTHER   Environmental Precautions Treaded Slipper Socks on Patient;Personal Belongings, Wastebasket, Call Bell etc. in Easy Reach;Bed in Low Position   Bed Alarm (Built in - for ICU ONLY) Patient Educated Regarding Fall Risk and Need for Bed Alarm, Understands and Continues to Refuse

## 2017-12-24 NOTE — PROGRESS NOTES
[facility] team Dr. Daniel Erazo aware of patient's WBC, PLT, and HGB. Awaiting new orders. Patient asymptomatic.

## 2017-12-24 NOTE — PROGRESS NOTES
Received report from Samm CHAVES. Pt in bed HOB elevated watching TV. Pt AXOX4, VS - T- 36.0, P- 77, R- 18 lungs clr in  apices, dim in RLL. SaO2- 96% on room air. BP- 106/51. Pt denies pn at this time. Pt H/H this am - 7.9/29.8, Plt - 15. Pt up ad alex, SCD's on for DVT prophylaxis. Call bell in reach, will continue to monitor.

## 2017-12-24 NOTE — PROGRESS NOTES
Received call from Jazmine from lab, about critical WBC 33.7, and critical plt 16. These values better than the lab values yesterday. Will continue to monitor.

## 2017-12-24 NOTE — PROGRESS NOTES
Internal Medicine Interval Note  Note Author: Ortega Antonio M.D.     Name Puja Briscoe     1945   Age/Sex 72 y.o. female   MRN [id number]   Code Status Full     After 5PM or if no immediate response to page, please call for cross-coverage  Attending/Team: Dr. Arenas / Castro  See Patient List for primary contact information  Call [phone] to page    1st Call - Day Intern (R1):   Dr. Arizmendi  2nd Call - Day Sr. Resident (R2/R3):   Dr. Antonio          Reason for interval visit  (Principal Problem)   Leukocytosis   Anemia  Thrombocytopenia     Interval Problem Daily Status Update  (24 hours)   Hb 6.7 last night, 1 unit of PRBCs given.  Leukocytosis trended down today.   Patient clinically denies any complaints, having good appetite, walking around the floor without issues.  EBV antibody elevated.   Keep monitor on tele, continuous pulse oxy, transfuse if Hb< 7, platelets < 10,000 or active bleeding.     Review of Systems   Constitutional: Negative for fever.   HENT: Negative for sore throat.    Eyes: Negative for blurred vision.   Respiratory: Negative for cough, hemoptysis and shortness of breath.    Cardiovascular: Negative for chest pain and palpitations.   Gastrointestinal: Negative for abdominal pain and nausea.   Genitourinary: Negative for dysuria.   Musculoskeletal: Negative for myalgias.   Skin: Negative for rash.   Neurological: Negative for dizziness.   Endo/Heme/Allergies: Does not bruise/bleed easily.   Psychiatric/Behavioral: Negative for depression.       Consultants/Specialty  Heme / Onc    Disposition  Inpatient     Quality Measures    Reviewed items::  Labs reviewed, Medications reviewed and Radiology images reviewed  Ngo catheter::  No Ngo  DVT prophylaxis pharmacological::  Contraindicated - High bleeding risk  DVT prophylaxis - mechanical:  SCDs  Ulcer Prophylaxis::  Not indicated          Physical Exam       Vitals:    17 0629 17 0645 17 0915 17 1316    BP: 130/55 125/56 121/52 138/65   Pulse: 84 78 87 91   Resp: 16 16 18 19   Temp: 36.6 °C (97.9 °F) 36.2 °C (97.2 °F) 36.4 °C (97.5 °F) 36.1 °C (96.9 °F)   TempSrc:       SpO2: 92% 93% 95% 95%   Weight:       Height:         Body mass index is 23.22 kg/m².    Oxygen Therapy:  Pulse Oximetry: 95 %, O2 (LPM): 0, O2 Delivery: None (Room Air)    Physical Exam   Constitutional: She is oriented to person, place, and time. No distress.   HENT:   Head: Normocephalic and atraumatic.   Eyes: EOM are normal. Pupils are equal, round, and reactive to light.   Cardiovascular: Normal rate and regular rhythm.  Exam reveals no gallop and no friction rub.    No murmur heard.  Pulmonary/Chest: Breath sounds normal. No respiratory distress.   Musculoskeletal: Normal range of motion. She exhibits no edema.   Lymphadenopathy:     She has no cervical adenopathy.   Neurological: She is alert and oriented to person, place, and time. No cranial nerve deficit. Coordination normal.   Skin: Skin is warm and dry. She is not diaphoretic.   Psychiatric: Affect normal.   Nursing note and vitals reviewed.        Lab Data Review:     Recent Labs      12/22/17 0353 12/23/17 0209 12/24/17 0156   SODIUM  141  138  138   POTASSIUM  3.7  4.3  4.1   CHLORIDE  115*  109  109   CO2  19*  20  22   BUN  8  14  16   CREATININE  0.48*  0.64  0.51   CALCIUM  8.1*  9.2  9.1       Recent Labs      12/22/17 0353 12/23/17 0209 12/24/17 0156   ALTSGPT  8  12   --    ASTSGOT  15  21   --    ALKPHOSPHAT  54  62   --    TBILIRUBIN  1.2  1.3   --    GLUCOSE  88  169*  140*       Recent Labs      12/22/17 0353 12/23/17 0209 12/24/17 0156   RBC  2.03*  2.30*  1.94*   HEMOGLOBIN  7.1*  7.9*  6.7*   HEMATOCRIT  22.5*  25.8*  21.6*   PLATELETCT  16*  15*  16*   PROTHROMBTM   --   14.3   --    APTT   --   23.7*   --    INR   --   1.14*   --    IRON   --   64   --    FERRITIN   --   240.5   --    TOTIRONBC   --   379   --        Recent Labs       12/22/17   0353  12/23/17   0209  12/24/17   0156   WBC  32.1*  56.2*  33.7*   NEUTSPOLYS  14.90*  3.50*  17.50*   LYMPHOCYTES  75.40*  85.10*  73.70*   MONOCYTES  3.50  0.90  1.70   EOSINOPHILS  1.70  0.00  0.90   BASOPHILS  0.00  0.00  0.00   ASTSGOT  15  21   --    ALTSGPT  8  12   --    ALKPHOSPHAT  54  62   --    TBILIRUBIN  1.2  1.3   --            Assessment/Plan     * Leukocytosis- (present on admission)   Assessment & Plan    - Leukocytosis with lymphocytic predominance, likely secondary to lymphoproliferative disorder vs CLL.  - no signs or symptoms of suspected infection. Blood cultures- NGTD  - abdominal ultrasound shows hepatosplenomegaly, however no lymphadenopathy on physical exam.   - no loss of appetite or loss of weight.   - Heme/onc on board.  - pending bone marrow biopsy result.  Patient had a bone marrow biopsy 12/22/17.          Anemia- (present on admission)   Assessment & Plan    - presented with 1 month duration of shortness of breath and fatigue likely due to anemia.  - Hb 6.3 on presentation along with leukocytosis and thrombocytopenia. S/p 3 units PRBCs  - Macrocytic anemia- normal Vitamin B12, folate and TSH. Iron on the lower side of normal, but otherwise normal iron studies  - Retic index indicating adequate reticulocyte response. Shaheen test positive- patient started on prednisone for autoimmune hemolytic anemia.   - Heme/onc on board. Bone marrow biopsy done on 12/22/17.   Plan:  - Continue prednisone  - f/u results of bone marrow  - Transfuse for hb <7        Thrombocytopenia (CMS-HCC)- (present on admission)   Assessment & Plan    - No pharmacological DVT prophylaxis given the anemia and thrombocytopenia. SCDs+  - HIV, hepatitis  c negative. EBV antibody positive.   - Watch for acute bleeding   - Transfuse for platelets <10,000 or active bleeding

## 2017-12-24 NOTE — PROGRESS NOTES
Oncology/Hematology Progress Note               Author: Clark Juan Date & Time created: 12/24/2017  2:23 PM   DX-LPD  AIHA  Interval History:  12/22/17-BM Bx well tolerated. Feels well. No bleeding.Pds started.  12/23/17-doing well. Had some insomnia. No CP or SOB  12/24/17-still await BM Bx results. Haptoglobin is not low as expected.    Review of Systems:  Review of Systems   Constitutional: Negative for fever.   Respiratory: Negative for cough, hemoptysis and sputum production.    Cardiovascular: Negative for chest pain, palpitations and leg swelling.   Gastrointestinal: Negative for heartburn, nausea and vomiting.   Genitourinary: Negative for dysuria and hematuria.   Musculoskeletal: Negative for back pain and myalgias.   Skin: Negative for itching and rash.   Neurological: Negative for weakness.   Psychiatric/Behavioral: Negative for depression. The patient is nervous/anxious and has insomnia.        Physical Exam:  Physical Exam   Constitutional: She is oriented to person, place, and time. She appears well-developed. No distress.   Neck: Thyromegaly present.   Cardiovascular: Normal rate.  Exam reveals no gallop.    No murmur heard.  Pulmonary/Chest: Effort normal and breath sounds normal. No respiratory distress. She has no wheezes. She has no rales.   Musculoskeletal: She exhibits no edema or tenderness.   Lymphadenopathy:     She has no cervical adenopathy.   Neurological: She is alert and oriented to person, place, and time. No cranial nerve deficit. Coordination normal.   Skin: Skin is warm and dry. She is not diaphoretic. No erythema.   Psychiatric: She has a normal mood and affect. Her behavior is normal.       Labs:        Invalid input(s): [id number]      Recent Labs      12/22/17   0353  12/23/17   0209  12/24/17   0156   SODIUM  141  138  138   POTASSIUM  3.7  4.3  4.1   CHLORIDE  115*  109  109   CO2  19*  20  22   BUN  8  14  16   CREATININE  0.48*  0.64  0.51   CALCIUM  8.1*  9.2  9.1      Recent Labs      176   ALTSGPT  8  12   --    ASTSGOT  15  21   --    ALKPHOSPHAT  54  62   --    TBILIRUBIN  1.2  1.3   --    GLUCOSE  88  169*  140*     Recent Labs      17   1356   RBC  2.30*  1.94*  2.81*   HEMOGLOBIN  7.9*  6.7*  9.3*   HEMATOCRIT  25.8*  21.6*  29.6*   PLATELETCT  15*  16*  19*   PROTHROMBTM  14.3   --    --    APTT  23.7*   --    --    INR  1.14*   --    --    IRON  64   --    --    FERRITIN  240.5   --    --    TOTIRONBC  379   --    --      Recent Labs      17   1356   WBC  32.1*  56.2*  33.7*  34.4*   NEUTSPOLYS  14.90*  3.50*  17.50*   --    LYMPHOCYTES  75.40*  85.10*  73.70*   --    MONOCYTES  3.50  0.90  1.70   --    EOSINOPHILS  1.70  0.00  0.90   --    BASOPHILS  0.00  0.00  0.00   --    ASTSGOT  15  21   --    --    ALTSGPT  8  12   --    --    ALKPHOSPHAT  54  62   --    --    TBILIRUBIN  1.2  1.3   --    --      Recent Labs      17   0156   SODIUM  141  138  138   POTASSIUM  3.7  4.3  4.1   CHLORIDE  115*  109  109   CO2  19*  20  22   GLUCOSE  88  169*  140*   BUN  8  14  16   CREATININE  0.48*  0.64  0.51   CALCIUM  8.1*  9.2  9.1     Hemodynamics:  Temp (24hrs), Av.4 °C (97.6 °F), Min:36 °C (96.8 °F), Max:37.2 °C (98.9 °F)  Temperature: 36.1 °C (96.9 °F)  Pulse  Av.5  Min: 77  Max: 128   Blood Pressure : 138/65     Respiratory:    Respiration: 19, Pulse Oximetry: 95 %     Work Of Breathing / Effort: Mild  RUL Breath Sounds: Clear, RML Breath Sounds: Diminished, RLL Breath Sounds: Diminished, RICHARD Breath Sounds: Clear, LLL Breath Sounds: Diminished  Fluids:    Intake/Output Summary (Last 24 hours) at 17 1801  Last data filed at 17 0600   Gross per 24 hour   Intake             1740 ml   Output              900 ml   Net              840 ml        GI/Nutrition:  Orders Placed  This Encounter   Procedures   • DIET ORDER     Standing Status:   Standing     Number of Occurrences:   1     Order Specific Question:   Diet:     Answer:   Regular [1]     Medical Decision Making, by Problem:  Active Hospital Problems    Diagnosis   • *Leukocytosis [D72.829]   • Anemia [D64.9]   • Thrombocytopenia (CMS-HCC) [D69.6]       Plan:  LPD-Likely LPD?CLL.Await BM Bx results  AIHA-12/22/17-Will start on steroids. Started on PDS.D/W Dr Arizmendi.  12/23/17-continue Pds . Well tolerated so far. H/H is accetable.  12/24/17-still has anemia. Possibility of coexistant BM infilt causing worsening anemia is there.  Further management depends upon Bx results.    Quality-Core Measures

## 2017-12-25 LAB
ANION GAP SERPL CALC-SCNC: 7 MMOL/L (ref 0–11.9)
ANISOCYTOSIS BLD QL SMEAR: ABNORMAL
BACTERIA BLD CULT: NORMAL
BACTERIA BLD CULT: NORMAL
BASOPHILS # BLD AUTO: 0.9 % (ref 0–1.8)
BASOPHILS # BLD: 0.24 K/UL (ref 0–0.12)
BUN SERPL-MCNC: 15 MG/DL (ref 8–22)
CALCIUM SERPL-MCNC: 9.2 MG/DL (ref 8.5–10.5)
CHLORIDE SERPL-SCNC: 111 MMOL/L (ref 96–112)
CO2 SERPL-SCNC: 23 MMOL/L (ref 20–33)
CREAT SERPL-MCNC: 0.55 MG/DL (ref 0.5–1.4)
EOSINOPHIL # BLD AUTO: 0.21 K/UL (ref 0–0.51)
EOSINOPHIL NFR BLD: 0.8 % (ref 0–6.9)
ERYTHROCYTE [DISTWIDTH] IN BLOOD BY AUTOMATED COUNT: 94.2 FL (ref 35.9–50)
GFR SERPL CREATININE-BSD FRML MDRD: >60 ML/MIN/1.73 M 2
GLUCOSE SERPL-MCNC: 104 MG/DL (ref 65–99)
HCT VFR BLD AUTO: 25.4 % (ref 37–47)
HGB BLD-MCNC: 7.9 G/DL (ref 12–16)
LYMPHOCYTES # BLD AUTO: 20.08 K/UL (ref 1–4.8)
LYMPHOCYTES NFR BLD: 75.2 % (ref 22–41)
MANUAL DIFF BLD: NORMAL
MCH RBC QN AUTO: 32.9 PG (ref 27–33)
MCHC RBC AUTO-ENTMCNC: 31.1 G/DL (ref 33.6–35)
MCV RBC AUTO: 105.8 FL (ref 81.4–97.8)
METAMYELOCYTES NFR BLD MANUAL: 0.9 %
MICROCYTES BLD QL SMEAR: ABNORMAL
MONOCYTES # BLD AUTO: 0.45 K/UL (ref 0–0.85)
MONOCYTES NFR BLD AUTO: 1.7 % (ref 0–13.4)
MORPHOLOGY BLD-IMP: NORMAL
NEUTROPHILS # BLD AUTO: 5.47 K/UL (ref 2–7.15)
NEUTROPHILS NFR BLD: 17.1 % (ref 44–72)
NEUTS BAND NFR BLD MANUAL: 3.4 % (ref 0–10)
NRBC # BLD AUTO: 0.24 K/UL
NRBC BLD-RTO: 0.9 /100 WBC
OVALOCYTES BLD QL SMEAR: NORMAL
PLATELET # BLD AUTO: 17 K/UL (ref 164–446)
PLATELET BLD QL SMEAR: NORMAL
PLATELETS.RETICULATED NFR BLD AUTO: 11.6 K/UL (ref 0.6–13.1)
PMV BLD AUTO: 11.6 FL (ref 9–12.9)
POIKILOCYTOSIS BLD QL SMEAR: NORMAL
POLYCHROMASIA BLD QL SMEAR: NORMAL
POTASSIUM SERPL-SCNC: 4.1 MMOL/L (ref 3.6–5.5)
RBC # BLD AUTO: 2.4 M/UL (ref 4.2–5.4)
RBC BLD AUTO: PRESENT
SIGNIFICANT IND 70042: NORMAL
SIGNIFICANT IND 70042: NORMAL
SITE SITE: NORMAL
SITE SITE: NORMAL
SODIUM SERPL-SCNC: 141 MMOL/L (ref 135–145)
SOURCE SOURCE: NORMAL
SOURCE SOURCE: NORMAL
WBC # BLD AUTO: 26.7 K/UL (ref 4.8–10.8)

## 2017-12-25 PROCEDURE — 85027 COMPLETE CBC AUTOMATED: CPT

## 2017-12-25 PROCEDURE — 700111 HCHG RX REV CODE 636 W/ 250 OVERRIDE (IP): Performed by: STUDENT IN AN ORGANIZED HEALTH CARE EDUCATION/TRAINING PROGRAM

## 2017-12-25 PROCEDURE — 99232 SBSQ HOSP IP/OBS MODERATE 35: CPT | Mod: GC | Performed by: INTERNAL MEDICINE

## 2017-12-25 PROCEDURE — 80048 BASIC METABOLIC PNL TOTAL CA: CPT

## 2017-12-25 PROCEDURE — 85007 BL SMEAR W/DIFF WBC COUNT: CPT

## 2017-12-25 PROCEDURE — 85055 RETICULATED PLATELET ASSAY: CPT

## 2017-12-25 PROCEDURE — 36415 COLL VENOUS BLD VENIPUNCTURE: CPT

## 2017-12-25 PROCEDURE — 770020 HCHG ROOM/CARE - TELE (206)

## 2017-12-25 RX ADMIN — PREDNISONE 60 MG: 20 TABLET ORAL at 08:18

## 2017-12-25 ASSESSMENT — ENCOUNTER SYMPTOMS
DOUBLE VISION: 0
BLURRED VISION: 0
DIZZINESS: 0
VOMITING: 0
CHILLS: 0
FEVER: 0
SHORTNESS OF BREATH: 0
HEARTBURN: 0
COUGH: 0
ORTHOPNEA: 0
NAUSEA: 0

## 2017-12-25 ASSESSMENT — PAIN SCALES - GENERAL
PAINLEVEL_OUTOF10: 0

## 2017-12-25 NOTE — PROGRESS NOTES
Received patient from night shift nurse. Assessment complete. A&Ox4. Denies pain. Pt is tolerating blood trans, no complaints. Call light within reach. All needs attended to at this time. Bed in low position, treaded slippers on feet.

## 2017-12-25 NOTE — PROGRESS NOTES
Received patient from night shift nurse. Assessment complete. A&Ox4. Denies pain. Call light within reach. All needs attended to at this time. Bed in low position, treaded slippers on feet.

## 2017-12-25 NOTE — CARE PLAN
Problem: Safety  Goal: Will remain free from injury  Educated pt on safety precautions and pt has verbalized understanding. Pt has demonstrated proper use of the call light and calls appropriately. Pt is wearing non slip socks, in bed in the low locked position and the call light is within reach    Problem: Knowledge Deficit  Goal: Knowledge of disease process/condition, treatment plan, diagnostic tests, and medications will improve  Pt will learn about disease process and all questions addressed and answered.

## 2017-12-25 NOTE — PROGRESS NOTES
Bedside report with ANDIE Mohamud   Patient sitting up visiting with family. Plan of care discussed. Needs met. Call light and personal belongings within reach. Will continue to monitor.

## 2017-12-25 NOTE — PROGRESS NOTES
[facility] [facility] Medicine Interval Note    Name Puja Briscoe     1945   Age/Sex 72 y.o. female   MRN [id number]   Code Status FULL     After 5PM or if no immediate response to page, please call for cross-coverage  Attending/Team: Dr. ELIZABETH/FARHAT Use Tiger Text to page  6AM-5PM  Call [phone] to page afterhours   1st Call - Day Intern (R1):   VICTORIA CRUZ 2nd Call - Day Sr. Resident (R2/R3):   IVAN ABRAHAM         Chief complaint/reason for interval visit  SOB with exertion, fatigue  Leukocytosis, anemia and thrombocytopenia    Interval Problem Update  Pt feels better today.  Denies any CARMONA  Hb 7.9 in the am  WBC tending down 33.7-->34.4-->26.7  PC 16-->19-->17  FOBT negative  Bone marrow biopsy results not back yet      Review of Systems   Constitutional: Negative for chills, fever and malaise/fatigue.   Eyes: Negative for blurred vision and double vision.   Respiratory: Negative for cough and shortness of breath.    Cardiovascular: Negative for chest pain, orthopnea and leg swelling.   Gastrointestinal: Negative for heartburn, nausea and vomiting.   Genitourinary: Negative for dysuria.   Skin: Negative for rash.   Neurological: Negative for dizziness.       Consultants/Specialty  Heam/onc    Disposition  Inpatient for treatment of anemia    Quality Measures    Reviewed items::  Labs reviewed, Medications reviewed and Radiology images reviewed  Ngo catheter::  No Ngo  DVT prophylaxis pharmacological::  Contraindicated - High bleeding risk  DVT prophylaxis - mechanical:  SCDs  Ulcer Prophylaxis::  No         Physical Exam   Vitals:    17 0000 17 0400 17 0730 17 1145   BP: 109/51 119/56 128/69 121/52   Pulse: 79 73 78 91   Resp:    Temp: 36.6 °C (97.8 °F) 36.3 °C (97.3 °F) 36.8 °C (98.2 °F) 36.7 °C (98.1 °F)   TempSrc:       SpO2: 96% 96% 94% 95%   Weight:       Height:         Body mass index is 23.22 kg/m².  Oxygen Therapy:  Pulse Oximetry: 95 %, O2 (LPM): 0, O2  Delivery: None (Room Air)    Physical Exam   Constitutional: She is oriented to person, place, and time and well-developed, well-nourished, and in no distress.   HENT:   Head: Normocephalic and atraumatic.   Eyes: EOM are normal. Pupils are equal, round, and reactive to light.   Neck: Normal range of motion.   Cardiovascular: Normal rate, regular rhythm and normal heart sounds.    Pulmonary/Chest: Effort normal and breath sounds normal.   Abdominal: Soft. Bowel sounds are normal.   Musculoskeletal: Normal range of motion.   Neurological: She is alert and oriented to person, place, and time.   Skin: Skin is warm.         Lab Data Review:  Recent Results (from the past 24 hour(s))   OCCULT BLOOD STOOL    Collection Time: 12/24/17 10:00 PM   Result Value Ref Range    Occult Blood Feces Negative Negative   CBC WITH DIFFERENTIAL    Collection Time: 12/25/17  2:19 AM   Result Value Ref Range    WBC 26.7 (H) 4.8 - 10.8 K/uL    RBC 2.40 (L) 4.20 - 5.40 M/uL    Hemoglobin 7.9 (L) 12.0 - 16.0 g/dL    Hematocrit 25.4 (L) 37.0 - 47.0 %    .8 (H) 81.4 - 97.8 fL    MCH 32.9 27.0 - 33.0 pg    MCHC 31.1 (L) 33.6 - 35.0 g/dL    RDW 94.2 (H) 35.9 - 50.0 fL    Platelet Count 17 (LL) 164 - 446 K/uL    MPV 11.6 9.0 - 12.9 fL    Nucleated RBC 0.90 /100 WBC    NRBC (Absolute) 0.24 K/uL    Neutrophils-Polys 17.10 (L) 44.00 - 72.00 %    Lymphocytes 75.20 (H) 22.00 - 41.00 %    Monocytes 1.70 0.00 - 13.40 %    Eosinophils 0.80 0.00 - 6.90 %    Basophils 0.90 0.00 - 1.80 %    Neutrophils (Absolute) 5.47 2.00 - 7.15 K/uL    Lymphs (Absolute) 20.08 (H) 1.00 - 4.80 K/uL    Monos (Absolute) 0.45 0.00 - 0.85 K/uL    Eos (Absolute) 0.21 0.00 - 0.51 K/uL    Baso (Absolute) 0.24 (H) 0.00 - 0.12 K/uL    Anisocytosis 1+     Microcytosis 1+    BASIC METABOLIC PANEL    Collection Time: 12/25/17  2:19 AM   Result Value Ref Range    Sodium 141 135 - 145 mmol/L    Potassium 4.1 3.6 - 5.5 mmol/L    Chloride 111 96 - 112 mmol/L    Co2 23 20 - 33 mmol/L     Glucose 104 (H) 65 - 99 mg/dL    Bun 15 8 - 22 mg/dL    Creatinine 0.55 0.50 - 1.40 mg/dL    Calcium 9.2 8.5 - 10.5 mg/dL    Anion Gap 7.0 0.0 - 11.9   ESTIMATED GFR    Collection Time: 12/25/17  2:19 AM   Result Value Ref Range    GFR If African American >60 >60 mL/min/1.73 m 2    GFR If Non African American >60 >60 mL/min/1.73 m 2   DIFFERENTIAL MANUAL    Collection Time: 12/25/17  2:19 AM   Result Value Ref Range    Bands-Stabs 3.40 0.00 - 10.00 %    Metamyelocytes 0.90 %    Manual Diff Status PERFORMED    PERIPHERAL SMEAR REVIEW    Collection Time: 12/25/17  2:19 AM   Result Value Ref Range    Peripheral Smear Review see below    PLATELET ESTIMATE    Collection Time: 12/25/17  2:19 AM   Result Value Ref Range    Plt Estimation Marked Decrease    MORPHOLOGY    Collection Time: 12/25/17  2:19 AM   Result Value Ref Range    RBC Morphology Present     Polychromia 1+     Poikilocytosis 1+     Ovalocytes 1+    IMMATURE PLT FRACTION    Collection Time: 12/25/17  2:19 AM   Result Value Ref Range    Imm. Plt Fraction 11.6 0.6 - 13.1 K/uL       12/25/2017  2:33 PM    Recent Labs      12/23/17   0209 12/24/17 0156  12/25/17 0219   SODIUM  138  138  141   POTASSIUM  4.3  4.1  4.1   CHLORIDE  109  109  111   CO2  20  22  23   BUN  14  16  15   CREATININE  0.64  0.51  0.55   CALCIUM  9.2  9.1  9.2       Recent Labs      12/23/17   0209 12/24/17   0156  12/25/17   0219   ALTSGPT  12   --    --    ASTSGOT  21   --    --    ALKPHOSPHAT  62   --    --    TBILIRUBIN  1.3   --    --    GLUCOSE  169*  140*  104*       Recent Labs      12/23/17   0209 12/24/17   0156  12/24/17   1356  12/25/17   0219   RBC  2.30*  1.94*  2.81*  2.40*   HEMOGLOBIN  7.9*  6.7*  9.3*  7.9*   HEMATOCRIT  25.8*  21.6*  29.6*  25.4*   PLATELETCT  15*  16*  19*  17*   PROTHROMBTM  14.3   --    --    --    APTT  23.7*   --    --    --    INR  1.14*   --    --    --    IRON  64   --    --    --    FERRITIN  240.5   --    --    --    [facility]  379    --    --    --        Recent Labs      12/23/17   0209  12/24/17   0156  12/24/17   1356  12/25/17   0219   WBC  56.2*  33.7*  34.4*  26.7*   NEUTSPOLYS  3.50*  17.50*   --   17.10*   LYMPHOCYTES  85.10*  73.70*   --   75.20*   MONOCYTES  0.90  1.70   --   1.70   EOSINOPHILS  0.00  0.90   --   0.80   BASOPHILS  0.00  0.00   --   0.90   ASTSGOT  21   --    --    --    ALTSGPT  12   --    --    --    ALKPHOSPHAT  62   --    --    --    TBILIRUBIN  1.3   --    --    --           Assessment/Plan   * Leukocytosis- (present on admission)   Assessment & Plan    Leukocytosis with lymphocytic predominance  likely 2/2 lymphoproliferative disorder vs CLL.  No signs of active infection   Blood cultures- NGTD  Abdominal ultrasound showed hepatosplenomegaly, however no lymphadenopathy on physical exam.   Heam/onc on board, appreciate recs          Anemia- (present on admission)   Assessment & Plan    Pt presented with CARMONA and fatigue since 1 month  Had WBC of 33.7, Hb of 6.3 and low PC on admission  Macrocytic anemia- normal Vitamin B12, folate and TSH.  Iron studies wnl except for low iron  Retic index indicated adequate reticulocyte response.   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   LDH, haptoglobin wnl  hB 6/7-->9.3-->7.9  Heme/onc on board. Bone marrow biopsy was done on 12/22/17- pending results  Plan:  Continue prednisone  f/u results of bone marrow  Transfuse for hb <7        Thrombocytopenia (CMS-HCC)- (present on admission)   Assessment & Plan    No pharmacological DVT prophylaxis given the anemia and thrombocytopenia.   SCDs+  HIV, hepatitis  c negative.   EBV antibody positive.   PC 16-->19-->17  Plan:  Watch for acute bleeding   Transfuse for platelets <10,000 or active bleeding

## 2017-12-26 LAB
ALBUMIN SERPL BCP-MCNC: 3.3 G/DL (ref 3.2–4.9)
ALBUMIN/GLOB SERPL: 1.6 G/DL
ALP SERPL-CCNC: 51 U/L (ref 30–99)
ALT SERPL-CCNC: 15 U/L (ref 2–50)
ANION GAP SERPL CALC-SCNC: 7 MMOL/L (ref 0–11.9)
ANISOCYTOSIS BLD QL SMEAR: ABNORMAL
AST SERPL-CCNC: 15 U/L (ref 12–45)
BASOPHILS # BLD AUTO: 0 % (ref 0–1.8)
BASOPHILS # BLD: 0 K/UL (ref 0–0.12)
BILIRUB SERPL-MCNC: 0.7 MG/DL (ref 0.1–1.5)
BUN SERPL-MCNC: 15 MG/DL (ref 8–22)
CALCIUM SERPL-MCNC: 9.1 MG/DL (ref 8.5–10.5)
CHLORIDE SERPL-SCNC: 108 MMOL/L (ref 96–112)
CO2 SERPL-SCNC: 25 MMOL/L (ref 20–33)
CREAT SERPL-MCNC: 0.49 MG/DL (ref 0.5–1.4)
EOSINOPHIL # BLD AUTO: 0.26 K/UL (ref 0–0.51)
EOSINOPHIL NFR BLD: 0.9 % (ref 0–6.9)
ERYTHROCYTE [DISTWIDTH] IN BLOOD BY AUTOMATED COUNT: 89.9 FL (ref 35.9–50)
GFR SERPL CREATININE-BSD FRML MDRD: >60 ML/MIN/1.73 M 2
GLOBULIN SER CALC-MCNC: 2.1 G/DL (ref 1.9–3.5)
GLUCOSE SERPL-MCNC: 95 MG/DL (ref 65–99)
HCT VFR BLD AUTO: 25 % (ref 37–47)
HGB BLD-MCNC: 7.7 G/DL (ref 12–16)
LYMPHOCYTES # BLD AUTO: 16.76 K/UL (ref 1–4.8)
LYMPHOCYTES NFR BLD: 58.8 % (ref 22–41)
MANUAL DIFF BLD: NORMAL
MCH RBC QN AUTO: 33 PG (ref 27–33)
MCHC RBC AUTO-ENTMCNC: 30.8 G/DL (ref 33.6–35)
MCV RBC AUTO: 107.3 FL (ref 81.4–97.8)
METAMYELOCYTES NFR BLD MANUAL: 1.7 %
MICROCYTES BLD QL SMEAR: ABNORMAL
MONOCYTES # BLD AUTO: 1.51 K/UL (ref 0–0.85)
MONOCYTES NFR BLD AUTO: 5.3 % (ref 0–13.4)
MORPHOLOGY BLD-IMP: NORMAL
MYELOCYTES NFR BLD MANUAL: 1.7 %
NEUTROPHILS # BLD AUTO: 9.01 K/UL (ref 2–7.15)
NEUTROPHILS NFR BLD: 27.2 % (ref 44–72)
NEUTS BAND NFR BLD MANUAL: 4.4 % (ref 0–10)
NRBC # BLD AUTO: 0.21 K/UL
NRBC BLD-RTO: 0.7 /100 WBC
PLATELET # BLD AUTO: 18 K/UL (ref 164–446)
PLATELET BLD QL SMEAR: NORMAL
PLATELETS.RETICULATED NFR BLD AUTO: 12.1 K/UL (ref 0.6–13.1)
PMV BLD AUTO: 11.4 FL (ref 9–12.9)
POIKILOCYTOSIS BLD QL SMEAR: NORMAL
POLYCHROMASIA BLD QL SMEAR: NORMAL
POTASSIUM SERPL-SCNC: 3.8 MMOL/L (ref 3.6–5.5)
PROT SERPL-MCNC: 5.4 G/DL (ref 6–8.2)
RBC # BLD AUTO: 2.33 M/UL (ref 4.2–5.4)
RBC BLD AUTO: PRESENT
SODIUM SERPL-SCNC: 140 MMOL/L (ref 135–145)
WBC # BLD AUTO: 28.5 K/UL (ref 4.8–10.8)

## 2017-12-26 PROCEDURE — 99232 SBSQ HOSP IP/OBS MODERATE 35: CPT | Mod: GC | Performed by: HOSPITALIST

## 2017-12-26 PROCEDURE — 700111 HCHG RX REV CODE 636 W/ 250 OVERRIDE (IP): Performed by: STUDENT IN AN ORGANIZED HEALTH CARE EDUCATION/TRAINING PROGRAM

## 2017-12-26 PROCEDURE — 36415 COLL VENOUS BLD VENIPUNCTURE: CPT

## 2017-12-26 PROCEDURE — 80053 COMPREHEN METABOLIC PANEL: CPT | Mod: 91

## 2017-12-26 PROCEDURE — 85055 RETICULATED PLATELET ASSAY: CPT | Mod: 91

## 2017-12-26 PROCEDURE — 85007 BL SMEAR W/DIFF WBC COUNT: CPT | Mod: 91

## 2017-12-26 PROCEDURE — 85027 COMPLETE CBC AUTOMATED: CPT | Mod: 91

## 2017-12-26 PROCEDURE — 770004 HCHG ROOM/CARE - ONCOLOGY PRIVATE *

## 2017-12-26 RX ADMIN — PREDNISONE 60 MG: 20 TABLET ORAL at 08:09

## 2017-12-26 ASSESSMENT — ENCOUNTER SYMPTOMS
FEVER: 0
CHILLS: 0
DOUBLE VISION: 0
HEARTBURN: 0
DIZZINESS: 0
ORTHOPNEA: 0
VOMITING: 0
NAUSEA: 0
COUGH: 0
SHORTNESS OF BREATH: 0
BLURRED VISION: 0

## 2017-12-26 ASSESSMENT — PAIN SCALES - GENERAL
PAINLEVEL_OUTOF10: 0
PAINLEVEL_OUTOF10: 0

## 2017-12-26 NOTE — PROGRESS NOTES
Bedside report received from day shift RN, pt is standing at bedside, family in the room, no needs at this time, call light and personal belongings in reach, hourly rounding in place.

## 2017-12-26 NOTE — CARE PLAN
Problem: Communication  Goal: The ability to communicate needs accurately and effectively will improve  Outcome: PROGRESSING AS EXPECTED  RN established therapeutic relationship, educated pt on use of call light, updated on POC and answered all questions.     Problem: Infection  Goal: Will remain free from infection  Outcome: PROGRESSING AS EXPECTED  RN educated pt on infection prevention, RN used thorough hand hygiene before and after interactions with pt, encouraged pt and family to use proper hand hygiene.

## 2017-12-26 NOTE — PROGRESS NOTES
Bedside report received, Pt care assumed. Tele box on. VSS. Pt assessment complete. Pt AOX4, no signs of distress noted at this time.  Pt c/o of 0/10 pain. Pt denies any additional needs at this time. Bed in lowest position, bed alarm is on, ambulates with no assistance. POC discussed with Pt/family, verbalizes understanding, no questions at this time. Pt educated on fall risk and verbalizes understanding, call light within reach, will continue to monitor.

## 2017-12-26 NOTE — PROGRESS NOTES
Pt is resting in bed, no needs at this time, call light and personal belongings in reach, hourly rounding in place.

## 2017-12-27 ENCOUNTER — PATIENT OUTREACH (OUTPATIENT)
Dept: HEALTH INFORMATION MANAGEMENT | Facility: OTHER | Age: 72
End: 2017-12-27

## 2017-12-27 VITALS
OXYGEN SATURATION: 93 % | TEMPERATURE: 97 F | WEIGHT: 132.28 LBS | HEART RATE: 83 BPM | BODY MASS INDEX: 22.04 KG/M2 | DIASTOLIC BLOOD PRESSURE: 64 MMHG | RESPIRATION RATE: 18 BRPM | HEIGHT: 65 IN | SYSTOLIC BLOOD PRESSURE: 121 MMHG

## 2017-12-27 LAB
ALBUMIN SERPL BCP-MCNC: 3.4 G/DL (ref 3.2–4.9)
ALBUMIN/GLOB SERPL: 1.6 G/DL
ALP SERPL-CCNC: 50 U/L (ref 30–99)
ALT SERPL-CCNC: 22 U/L (ref 2–50)
ANION GAP SERPL CALC-SCNC: 8 MMOL/L (ref 0–11.9)
ANISOCYTOSIS BLD QL SMEAR: ABNORMAL
AST SERPL-CCNC: 18 U/L (ref 12–45)
BASOPHILS # BLD AUTO: 0 % (ref 0–1.8)
BASOPHILS # BLD: 0 K/UL (ref 0–0.12)
BILIRUB SERPL-MCNC: 0.7 MG/DL (ref 0.1–1.5)
BUN SERPL-MCNC: 18 MG/DL (ref 8–22)
CALCIUM SERPL-MCNC: 9.2 MG/DL (ref 8.5–10.5)
CHLORIDE SERPL-SCNC: 107 MMOL/L (ref 96–112)
CO2 SERPL-SCNC: 24 MMOL/L (ref 20–33)
CREAT SERPL-MCNC: 0.48 MG/DL (ref 0.5–1.4)
EOSINOPHIL # BLD AUTO: 0.26 K/UL (ref 0–0.51)
EOSINOPHIL NFR BLD: 0.9 % (ref 0–6.9)
ERYTHROCYTE [DISTWIDTH] IN BLOOD BY AUTOMATED COUNT: 86.9 FL (ref 35.9–50)
GFR SERPL CREATININE-BSD FRML MDRD: >60 ML/MIN/1.73 M 2
GLOBULIN SER CALC-MCNC: 2.1 G/DL (ref 1.9–3.5)
GLUCOSE SERPL-MCNC: 94 MG/DL (ref 65–99)
HCT VFR BLD AUTO: 25.2 % (ref 37–47)
HGB BLD-MCNC: 7.9 G/DL (ref 12–16)
LYMPHOCYTES # BLD AUTO: 19.23 K/UL (ref 1–4.8)
LYMPHOCYTES NFR BLD: 67 % (ref 22–41)
MACROCYTES BLD QL SMEAR: ABNORMAL
MANUAL DIFF BLD: NORMAL
MCH RBC QN AUTO: 33.5 PG (ref 27–33)
MCHC RBC AUTO-ENTMCNC: 31.3 G/DL (ref 33.6–35)
MCV RBC AUTO: 106.8 FL (ref 81.4–97.8)
MICROCYTES BLD QL SMEAR: ABNORMAL
MONOCYTES # BLD AUTO: 2.55 K/UL (ref 0–0.85)
MONOCYTES NFR BLD AUTO: 8.9 % (ref 0–13.4)
MORPHOLOGY BLD-IMP: NORMAL
NEUTROPHILS # BLD AUTO: 6.66 K/UL (ref 2–7.15)
NEUTROPHILS NFR BLD: 21.4 % (ref 44–72)
NEUTS BAND NFR BLD MANUAL: 1.8 % (ref 0–10)
NRBC # BLD AUTO: 0.29 K/UL
NRBC BLD-RTO: 1 /100 WBC
PLATELET # BLD AUTO: 20 K/UL (ref 164–446)
PLATELET BLD QL SMEAR: NORMAL
PLATELETS.RETICULATED NFR BLD AUTO: 9.4 K/UL (ref 0.6–13.1)
PMV BLD AUTO: 10 FL (ref 9–12.9)
POIKILOCYTOSIS BLD QL SMEAR: NORMAL
POLYCHROMASIA BLD QL SMEAR: NORMAL
POTASSIUM SERPL-SCNC: 3.8 MMOL/L (ref 3.6–5.5)
PROT SERPL-MCNC: 5.5 G/DL (ref 6–8.2)
RBC # BLD AUTO: 2.36 M/UL (ref 4.2–5.4)
RBC BLD AUTO: PRESENT
SODIUM SERPL-SCNC: 139 MMOL/L (ref 135–145)
WBC # BLD AUTO: 28.7 K/UL (ref 4.8–10.8)

## 2017-12-27 PROCEDURE — 99239 HOSP IP/OBS DSCHRG MGMT >30: CPT | Mod: GC | Performed by: HOSPITALIST

## 2017-12-27 PROCEDURE — 700111 HCHG RX REV CODE 636 W/ 250 OVERRIDE (IP): Performed by: STUDENT IN AN ORGANIZED HEALTH CARE EDUCATION/TRAINING PROGRAM

## 2017-12-27 PROCEDURE — 700102 HCHG RX REV CODE 250 W/ 637 OVERRIDE(OP): Performed by: STUDENT IN AN ORGANIZED HEALTH CARE EDUCATION/TRAINING PROGRAM

## 2017-12-27 PROCEDURE — A9270 NON-COVERED ITEM OR SERVICE: HCPCS | Performed by: STUDENT IN AN ORGANIZED HEALTH CARE EDUCATION/TRAINING PROGRAM

## 2017-12-27 RX ORDER — PREDNISONE 20 MG/1
60 TABLET ORAL DAILY
Qty: 30 TAB | Refills: 0 | Status: SHIPPED | OUTPATIENT
Start: 2017-12-27 | End: 2019-01-25

## 2017-12-27 RX ADMIN — PREDNISONE 60 MG: 20 TABLET ORAL at 08:45

## 2017-12-27 ASSESSMENT — ENCOUNTER SYMPTOMS
HEMOPTYSIS: 0
FEVER: 0
SORE THROAT: 0
SPUTUM PRODUCTION: 0
NERVOUS/ANXIOUS: 1
INSOMNIA: 1
DEPRESSION: 0
VOMITING: 0
COUGH: 0
HEARTBURN: 0
WEAKNESS: 0
BACK PAIN: 0
MYALGIAS: 0
PALPITATIONS: 0
NAUSEA: 0

## 2017-12-27 NOTE — PROGRESS NOTES
[facility] [place] Internal Medicine Interval Note    Name Puja Briscoe     1945   Age/Sex 72 y.o. female   MRN [id number]   Code Status FULL     After 5PM or if no immediate response to page, please call for cross-coverage  Attending/Team: Dr. CESAR/FARHAT Use Tiger Text to page  6AM-5PM  Call [phone] to page afterhours   1st Call - Day Intern (R1):   VICTORIA CRUZ 2nd Call - Day Sr. Resident (R2/R3):   IVAN ABRAHAM         Chief complaint/reason for interval visit  SOB with exertion, fatigue  Leukocytosis, anemia and thrombocytopenia    Interval Problem Update  Pt feels better today.  Denies any CARMONA  Hb 7.7 in the am  WBC 33.7-->34.4-->26.7-->28.5, but pt is on prednisone for AIHA  PC 16-->19-->17-->18  FOBT negative  Bone marrow biopsy results not back yet, will f/u      Review of Systems   Constitutional: Negative for chills, fever and malaise/fatigue.   Eyes: Negative for blurred vision and double vision.   Respiratory: Negative for cough and shortness of breath.    Cardiovascular: Negative for chest pain, orthopnea and leg swelling.   Gastrointestinal: Negative for heartburn, nausea and vomiting.   Genitourinary: Negative for dysuria.   Skin: Negative for rash.   Neurological: Negative for dizziness.       Consultants/Specialty  Heam/onc    Disposition  Inpatient for treatment of anemia    Quality Measures    Reviewed items::  Labs reviewed, Medications reviewed and Radiology images reviewed  Ngo catheter::  No Ngo  DVT prophylaxis pharmacological::  Contraindicated - High bleeding risk  DVT prophylaxis - mechanical:  SCDs  Ulcer Prophylaxis::  No         Physical Exam   Vitals:    17 0800 17 1141 17 1547 17 1718   BP: 151/76 137/53 142/59 151/58   Pulse: 79 97 86 85   Resp: 16 15 16 16   Temp: 36.6 °C (97.9 °F) 36.8 °C (98.3 °F) 36.7 °C (98.1 °F) 37.1 °C (98.8 °F)   TempSrc:       SpO2: 94% 94% 93% 95%   Weight:       Height:         Body mass index is 22.01 kg/m².  Oxygen  Therapy:  Pulse Oximetry: 95 %, O2 (LPM): 0, O2 Delivery: None (Room Air)    Physical Exam   Constitutional: She is oriented to person, place, and time and well-developed, well-nourished, and in no distress.   HENT:   Head: Normocephalic and atraumatic.   Eyes: EOM are normal. Pupils are equal, round, and reactive to light.   Neck: Normal range of motion.   Cardiovascular: Normal rate, regular rhythm and normal heart sounds.    Pulmonary/Chest: Effort normal and breath sounds normal.   Abdominal: Soft. Bowel sounds are normal.   Musculoskeletal: Normal range of motion.   Neurological: She is alert and oriented to person, place, and time.   Skin: Skin is warm.         Lab Data Review:  Recent Results (from the past 24 hour(s))   CBC WITH DIFFERENTIAL    Collection Time: 12/26/17  3:23 AM   Result Value Ref Range    WBC 28.5 (H) 4.8 - 10.8 K/uL    RBC 2.33 (L) 4.20 - 5.40 M/uL    Hemoglobin 7.7 (L) 12.0 - 16.0 g/dL    Hematocrit 25.0 (L) 37.0 - 47.0 %    .3 (H) 81.4 - 97.8 fL    MCH 33.0 27.0 - 33.0 pg    MCHC 30.8 (L) 33.6 - 35.0 g/dL    RDW 89.9 (H) 35.9 - 50.0 fL    Platelet Count 18 (LL) 164 - 446 K/uL    MPV 11.4 9.0 - 12.9 fL    Nucleated RBC 0.70 /100 WBC    NRBC (Absolute) 0.21 K/uL    Neutrophils-Polys 27.20 (L) 44.00 - 72.00 %    Lymphocytes 58.80 (H) 22.00 - 41.00 %    Monocytes 5.30 0.00 - 13.40 %    Eosinophils 0.90 0.00 - 6.90 %    Basophils 0.00 0.00 - 1.80 %    Neutrophils (Absolute) 9.01 (H) 2.00 - 7.15 K/uL    Lymphs (Absolute) 16.76 (H) 1.00 - 4.80 K/uL    Monos (Absolute) 1.51 (H) 0.00 - 0.85 K/uL    Eos (Absolute) 0.26 0.00 - 0.51 K/uL    Baso (Absolute) 0.00 0.00 - 0.12 K/uL    Anisocytosis 1+     Microcytosis 1+    COMP METABOLIC PANEL    Collection Time: 12/26/17  3:23 AM   Result Value Ref Range    Sodium 140 135 - 145 mmol/L    Potassium 3.8 3.6 - 5.5 mmol/L    Chloride 108 96 - 112 mmol/L    Co2 25 20 - 33 mmol/L    Anion Gap 7.0 0.0 - 11.9    Glucose 95 65 - 99 mg/dL    Bun 15 8 - 22  mg/dL    Creatinine 0.49 (L) 0.50 - 1.40 mg/dL    Calcium 9.1 8.5 - 10.5 mg/dL    AST(SGOT) 15 12 - 45 U/L    ALT(SGPT) 15 2 - 50 U/L    Alkaline Phosphatase 51 30 - 99 U/L    Total Bilirubin 0.7 0.1 - 1.5 mg/dL    Albumin 3.3 3.2 - 4.9 g/dL    Total Protein 5.4 (L) 6.0 - 8.2 g/dL    Globulin 2.1 1.9 - 3.5 g/dL    A-G Ratio 1.6 g/dL   ESTIMATED GFR    Collection Time: 12/26/17  3:23 AM   Result Value Ref Range    GFR If African American >60 >60 mL/min/1.73 m 2    GFR If Non African American >60 >60 mL/min/1.73 m 2   DIFFERENTIAL MANUAL    Collection Time: 12/26/17  3:23 AM   Result Value Ref Range    Bands-Stabs 4.40 0.00 - 10.00 %    Metamyelocytes 1.70 %    Myelocytes 1.70 %    Manual Diff Status PERFORMED    PERIPHERAL SMEAR REVIEW    Collection Time: 12/26/17  3:23 AM   Result Value Ref Range    Peripheral Smear Review see below    PLATELET ESTIMATE    Collection Time: 12/26/17  3:23 AM   Result Value Ref Range    Plt Estimation Marked Decrease    MORPHOLOGY    Collection Time: 12/26/17  3:23 AM   Result Value Ref Range    RBC Morphology Present     Polychromia 1+     Poikilocytosis 1+    IMMATURE PLT FRACTION    Collection Time: 12/26/17  3:23 AM   Result Value Ref Range    Imm. Plt Fraction 12.1 0.6 - 13.1 K/uL       12/25/2017  2:33 PM    Recent Labs      12/24/17   0156  12/25/17   0219  12/26/17   0323   SODIUM  138  141  140   POTASSIUM  4.1  4.1  3.8   CHLORIDE  109  111  108   CO2  22  23  25   BUN  16  15  15   CREATININE  0.51  0.55  0.49*   CALCIUM  9.1  9.2  9.1       Recent Labs      12/24/17   0156  12/25/17   0219  12/26/17   0323   ALTSGPT   --    --   15   ASTSGOT   --    --   15   ALKPHOSPHAT   --    --   51   TBILIRUBIN   --    --   0.7   GLUCOSE  140*  104*  95       Recent Labs      12/24/17   1356  12/25/17   0219  12/26/17   0323   RBC  2.81*  2.40*  2.33*   HEMOGLOBIN  9.3*  7.9*  7.7*   HEMATOCRIT  29.6*  25.4*  25.0*   PLATELETCT  19*  17*  18*       Recent Labs      12/24/17   0156   12/24/17   1356  12/25/17   0219  12/26/17   0323   WBC  33.7*  34.4*  26.7*  28.5*   NEUTSPOLYS  17.50*   --   17.10*  27.20*   LYMPHOCYTES  73.70*   --   75.20*  58.80*   MONOCYTES  1.70   --   1.70  5.30   EOSINOPHILS  0.90   --   0.80  0.90   BASOPHILS  0.00   --   0.90  0.00   ASTSGOT   --    --    --   15   ALTSGPT   --    --    --   15   ALKPHOSPHAT   --    --    --   51   TBILIRUBIN   --    --    --   0.7          Assessment/Plan   * Leukocytosis- (present on admission)   Assessment & Plan    Leukocytosis with lymphocytic predominance  Myelocytes and metamyelocytes in the differential  likely 2/2 lymphoproliferative disorder vs CLL.  No signs of active infection   Blood cultures- NGTD  Abdominal ultrasound showed hepatosplenomegaly, however no lymphadenopathy on physical exam.   Heam/onc on board, appreciate recs          Anemia- (present on admission)   Assessment & Plan    Pt presented with CARMONA and fatigue since 1 month  Had WBC of 33.7, Hb of 6.3 and low PC on admission  Macrocytic anemia- normal Vitamin B12, folate and TSH.  Iron studies wnl except for low iron  Retic index indicated adequate reticulocyte response.   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   LDH, haptoglobin wnl  hb 6/7-->9.3-->7.9-->7.7  Heme/onc on board. Bone marrow biopsy was done on 12/22/17- pending results  Plan:  Continue prednisone  f/u results of bone marrow  Transfuse for hb <7 or PC<10        Thrombocytopenia (CMS-HCC)- (present on admission)   Assessment & Plan    No pharmacological DVT prophylaxis given the anemia and thrombocytopenia.   SCDs+  HIV, hepatitis  c negative.   EBV antibody positive.   PC 16-->19-->17-->18  Plan:  Watch for acute bleeding   Transfuse for platelets <10,000 or active bleeding

## 2017-12-27 NOTE — PROGRESS NOTES
Pt care assumed, discussed plan of care, report received, a/ox4, upself, regular diet, Right PIV saline locked, bone marrow biopsy performed 12/22, results pendingno needs at this time, call light within reach and safety precautions in place.

## 2017-12-27 NOTE — PROGRESS NOTES
Oncology/Hematology Progress Note               Author: Kalmarques Jaun Date & Time created: 12/27/2017  8:01 AM   DX-LPD  AIHA  Thrombocytopenia  Interval History:  12/22/17-BM Bx well tolerated. Feels well. No bleeding.Pds started.  12/23/17-doing well. Had some insomnia. No CP or SOB  12/24/17-still await BM Bx results. Haptoglobin is not low as expected.  12/27/17-Await BM bx results. Will talk to Med resident reg ID home and daily CBC in [facility] and to fu with me next week.    Review of Systems:  Review of Systems   Constitutional: Positive for malaise/fatigue. Negative for fever.   HENT: Negative for hearing loss and sore throat.    Respiratory: Negative for cough, hemoptysis and sputum production.    Cardiovascular: Negative for chest pain, palpitations and leg swelling.   Gastrointestinal: Negative for heartburn, nausea and vomiting.   Genitourinary: Negative for dysuria and hematuria.   Musculoskeletal: Negative for back pain and myalgias.   Skin: Negative for itching and rash.   Neurological: Negative for weakness.   Psychiatric/Behavioral: Negative for depression. The patient is nervous/anxious and has insomnia.        Physical Exam:  Physical Exam   Constitutional: She is oriented to person, place, and time. She appears well-developed. No distress.   Neck: Thyromegaly present.   Cardiovascular: Normal rate.  Exam reveals no gallop.    No murmur heard.  Pulmonary/Chest: Effort normal and breath sounds normal. No respiratory distress. She has no wheezes. She has no rales.   Abdominal: Soft. Bowel sounds are normal. She exhibits no distension. There is no tenderness. There is no rebound.   Musculoskeletal: She exhibits no edema or tenderness.   Lymphadenopathy:     She has no cervical adenopathy.   Neurological: She is alert and oriented to person, place, and time. No cranial nerve deficit. Coordination normal.   Skin: Skin is warm and dry. She is not diaphoretic. No erythema. No pallor.   Psychiatric: She has a  normal mood and affect. Her behavior is normal.       Labs:        Invalid input(s): [id number]      Recent Labs      17   SODIUM  141  140  139   POTASSIUM  4.1  3.8  3.8   CHLORIDE  111  108  107   CO2  23  25  24   BUN  15  15  18   CREATININE  0.55  0.49*  0.48*   CALCIUM  9.2  9.1  9.2     Recent Labs      17   ALTSGPT   --   15  22   ASTSGOT   --   15  18   ALKPHOSPHAT   --   51  50   TBILIRUBIN   --   0.7  0.7   GLUCOSE  104*  95  94     Recent Labs      17   RBC  2.40*  2.33*  2.36*   HEMOGLOBIN  7.9*  7.7*  7.9*   HEMATOCRIT  25.4*  25.0*  25.2*   PLATELETCT  17*  18*  20*     Recent Labs      17   WBC  26.7*  28.5*  28.7*   NEUTSPOLYS  17.10*  27.20*  21.40*   LYMPHOCYTES  75.20*  58.80*  67.00*   MONOCYTES  1.70  5.30  8.90   EOSINOPHILS  0.80  0.90  0.90   BASOPHILS  0.90  0.00  0.00   ASTSGOT   --   15  18   ALTSGPT   --   15  22   ALKPHOSPHAT   --   51  50   TBILIRUBIN   --   0.7  0.7     Recent Labs      17   SODIUM  141  140  139   POTASSIUM  4.1  3.8  3.8   CHLORIDE  111  108  107   CO2  23  25  24   GLUCOSE  104*  95  94   BUN  15  15  18   CREATININE  0.55  0.49*  0.48*   CALCIUM  9.2  9.1  9.2     Hemodynamics:  Temp (24hrs), Av.9 °C (98.5 °F), Min:36.7 °C (98.1 °F), Max:37.1 °C (98.8 °F)  Temperature: 37.1 °C (98.7 °F)  Pulse  Av.7  Min: 73  Max: 128   Blood Pressure : 108/60     Respiratory:    Respiration: 18, Pulse Oximetry: 92 %     Work Of Breathing / Effort: Mild  RUL Breath Sounds: Clear, RML Breath Sounds: Clear, RLL Breath Sounds: Clear;Diminished, RICHARD Breath Sounds: Clear, LLL Breath Sounds: Diminished  Fluids:    Intake/Output Summary (Last 24 hours) at 17 1801  Last data filed at 17 0600   Gross per 24 hour   Intake              1740 ml   Output              900 ml   Net              840 ml        GI/Nutrition:  Orders Placed This Encounter   Procedures   • DIET ORDER     Standing Status:   Standing     Number of Occurrences:   1     Order Specific Question:   Diet:     Answer:   Regular [1]     Medical Decision Making, by Problem:  Active Hospital Problems    Diagnosis   • *Leukocytosis [D72.829]   • Anemia [D64.9]   • Thrombocytopenia (CMS-HCC) [D69.6]       Plan:  LPD-Likely LPD?CLL.Await BM Bx results  AIHA-12/22/17-Will continue on steroids. Started on PDS.12/23/17-continue Pds . Well tolerated so far. H/H is accetable.  12/24/17-still has anemia. Possibility of coexistant BM infilt causing worsening anemia is there.  12/27/17-still awaiting results of BM Bx. Will D/W MR reg dc home and fu CBC daily in [facility] and transfusing as needed. Can fu in office next week. D/W NS.  Further management depends upon Bx results.    Quality-Core Measures

## 2017-12-27 NOTE — CARE PLAN
Problem: Infection  Goal: Will remain free from infection    Intervention: Assess signs and symptoms of infection  Pt educated on S/S of infection around biopsy site.      Problem: Bowel/Gastric:  Goal: Normal bowel function is maintained or improved    Intervention: Educate patient and significant other/support system about diet, fluid intake, medications and activity to promote bowel function  Educated patient on regular diet and oral fluid intake.

## 2017-12-27 NOTE — DISCHARGE SUMMARY
[facility] [place] [facility] Discharge Summary      Admit Date:  12/20/2017       Discharge Date:   FULL    Service:   R Internal Medicine [place] Team  Attending Physician(s):   Dr. Fritz       Senior Resident(s):    Dr. Antonio  Laci Resident(s):   Dr. Soto      Diagnoses:     Anemia  Thrombocytopenia  Leukocytosis           Hospital Summary (Brief Narrative):       Patient is a 73 y/o female with no significant PMH and not on any home medications was sent in by her PCP for evaluation of her SOB and fatigue. As per the patient, the SOB has been progressively worsening over the past 6-8 weeks and she would get SOB even on walking to her mailbox. SOB would be associated with chest tightness and fatigue which resolves once the SOB improves. Over the past 2 weeks, she was in bed as she had no energy with FRANCESCA.  On arrival to the ED, she had leukocytosis of 45 with a predominance of lymphocytes, Hb at 6.3 and platelet count of 22. FOBT was negative and the patient was transfused 1u of PRBCs in the ED. Hb post transfusion was 6.2 and 1 more unit was transfused and the repeat hb was 8.4. On the differential, pt was noted to have severe macrocytic anemia and positive Shaheen test. LDH, haptoglobin was wnl. EBV antibody was positive but HIV and hepatitic c was negative .U/S abdomen showed hepatosplenomegaly but pt didn't have any lymphadenopathy on physical exam or any B symptoms. 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  Over the course of the hospitalization, pt felt better- reported improvement in her fatigue and SOB, WBC counts 33.7-->34.4-->26.7-->28.7 but patient was on steroids. Hb levels remained stable 6.7-->9.3-->7.9-->7.7-->7.9. Platelet counts were also stable at 16-->19-->17-->18-->20. Patient was closely monitored for any signs of bleeding. Anemia not improving/worsening  likely due to a co existant bone marrow infiltrate. Patient was discharged in a stable condition on 12/27/2017 with prednisone 60mg and orders to get daily CBCs at [facility], to call Dr. Martinez with results and for transfusion orders. Patient was has an appointment with Dr. Martinez on 1/8/2018 to discuss the biopsy results and for f/u.      Patient /Hospital Summary (Details -- Problem Oriented) :          Anemia   Assessment & Plan    Pt presented with CARMONA and fatigue since 1 month  Had WBC of 33.7, Hb of 6.3 and low PC on admission  Macrocytic anemia- normal Vitamin B12, folate and TSH.  Iron studies wnl except for low iron  Retic index indicated adequate reticulocyte response.   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   LDH, haptoglobin wnl  hb 6/7-->9.3-->7.9-->7.7-->7.9  Heme/onc on board. Bone marrow biopsy was done on 12/22/17- pending results  Not resolving anemia likely due to an underlying bone marrow infiltrative disorder  Plan:  Continue prednisone 60 MG on discharge  Pt to get daily CBCs at [facility] with instructions to call Dr. Martinez with results and for transfusion orders.  Pt has an appointment with Dr. Martinez on 1/8/18 to discuss biopsy results and f/u        * Leukocytosis   Assessment & Plan    Leukocytosis with lymphocytic predominance  Myelocytes and metamyelocytes in the differential  likely 2/2 lymphoproliferative disorder vs CLL.  No signs of active infection   Blood cultures- NGTD  Abdominal ultrasound showed hepatosplenomegaly, however no lymphadenopathy on physical exam.   Plan:  Pt has orders for daily CBCs    To be f/u by Dr. Martinez          Thrombocytopenia (CMS-HCC)   Assessment & Plan    No pharmacological DVT prophylaxis given the anemia and thrombocytopenia.   SCDs+  HIV, hepatitis  c negative.   EBV antibody positive.   PC 16-->19-->17-->18-->20  Plan:  Pt to get daily CBCs at [facility] with instructions to call Dr. Martinez with results  and for transfusion orders.  Pt has an appointment with Dr. Martinez on 1/8/18 to discuss biopsy results and f/u                Consultants:     Haem/onc    Procedures:        Bone marrow biopsy 12/22/2017    Imaging/ Testing:        CXR 12/20/17: No acute cardiac or pulmonary abnormalities are identified.    CXR 12/21/17: No active cardiopulmonary abnormalities are identified    U/S ABD 12/22/17: Hepatosplenomegaly      Discharge Medications:         Medication Reconciliation: Reviewed <Y>     Medication List      START taking these medications      Instructions   predniSONE 20 MG Tabs  Commonly known as:  DELTASONE   Take 3 Tabs by mouth every day.  Dose:  60 mg              Disposition:   Home    Diet:   Healthy diet    Activity:   As tolerated    Instructions:      Patient to get daily CBCs at \[facility]\"", instructions put in to call Dr. Martinez with results and for transfusion orders.  Patient to see Dr. Martinez early next week- appointment set up by the RN  The patient was instructed to return to the ER in the event of worsening symptoms. I have counseled the patient on the importance of compliance and the patient has agreed to proceed with all medical recommendations and follow up plan indicated above.   The patient understands that all medications come with benefits and risks. Risks may include permanent injury or death and these risks can be minimized with close reassessment and monitoring.        Primary Care Provider:    Elan Ashraf M.D.    Discharge summary faxed to primary care provider:  <N>  Copy of discharge summary given to the patient: <N>    Follow up appointment details :      1/8/2018 at 4pm with Dr. Martinez  Pt to schedule appointment with the PCP within the next week-  couldn't schedule as appointment office was closed. Pt provided details on whom to call.    Pending Studies:        Bone marrow biopsy results    Time spent on discharge day patient visit, preparing discharge paperwork and  arranging for patient follow up.    Discharge Time (Minutes) :    30    ______________________________________________________________________    Interval history/exam for day of discharge:     Pt feels better today  Denies any SOB/fatigue  Hb has been stable at 7.9  PC at 20  Pending bone marrow biopsy results  Pt ok to be discharged per Dr. Martinez- to get daily CBCs at [facility] and to call Dr. Martinez with results and transfusion orders  Pt to be discharged on prednisone 60mg with no taper- has an appointment set up with Dr. Martinez early next week.    Vitals:    12/26/17 2000 12/27/17 0500 12/27/17 0750 12/27/17 1600   BP: 128/57 110/56 108/60 121/64   Pulse: 94 77 76 83   Resp: 18 18 18 18   Temp: 37.1 °C (98.7 °F) 37 °C (98.6 °F) 37.1 °C (98.7 °F) 36.1 °C (97 °F)   TempSrc:       SpO2: 92% 96% 92% 93%   Weight:       Height:         Weight/BMI: Body mass index is 22.01 kg/m².  Pulse Oximetry: 93 %, O2 (LPM): 0, O2 Delivery: None (Room Air)    General: Not in any acute distress  CVS: S1 S2 heard  PULM: CTA B/L      Most Recent Labs:    Lab Results   Component Value Date/Time    WBC 28.7 (H) 12/26/2017 11:52 PM    RBC 2.36 (L) 12/26/2017 11:52 PM    HEMOGLOBIN 7.9 (L) 12/26/2017 11:52 PM    HEMATOCRIT 25.2 (L) 12/26/2017 11:52 PM    .8 (H) 12/26/2017 11:52 PM    MCH 33.5 (H) 12/26/2017 11:52 PM    MCHC 31.3 (L) 12/26/2017 11:52 PM    MPV 10.0 12/26/2017 11:52 PM    NEUTSPOLYS 21.40 (L) 12/26/2017 11:52 PM    LYMPHOCYTES 67.00 (H) 12/26/2017 11:52 PM    MONOCYTES 8.90 12/26/2017 11:52 PM    EOSINOPHILS 0.90 12/26/2017 11:52 PM    BASOPHILS 0.00 12/26/2017 11:52 PM    ANISOCYTOSIS 1+ 12/26/2017 11:52 PM      Lab Results   Component Value Date/Time    SODIUM 139 12/26/2017 11:52 PM    POTASSIUM 3.8 12/26/2017 11:52 PM    CHLORIDE 107 12/26/2017 11:52 PM    CO2 24 12/26/2017 11:52 PM    GLUCOSE 94 12/26/2017 11:52 PM    BUN 18 12/26/2017 11:52 PM    CREATININE 0.48 (L) 12/26/2017 11:52 PM    CREATININE 0.9 04/26/2008  10:06 AM      Lab Results   Component Value Date/Time    ALTSGPT 22 12/26/2017 11:52 PM    ASTSGOT 18 12/26/2017 11:52 PM    ALKPHOSPHAT 50 12/26/2017 11:52 PM    TBILIRUBIN 0.7 12/26/2017 11:52 PM    LIPASE 22 12/20/2017 05:22 PM    ALBUMIN 3.4 12/26/2017 11:52 PM    GLOBULIN 2.1 12/26/2017 11:52 PM    INR 1.14 (H) 12/23/2017 02:09 AM    MACROCYTOSIS 1+ 12/26/2017 11:52 PM     Lab Results   Component Value Date/Time    PROTHROMBTM 14.3 12/23/2017 02:09 AM    INR 1.14 (H) 12/23/2017 02:09 AM

## 2017-12-27 NOTE — PROGRESS NOTES
Report received from night shift Rn. Assumed care at 0700. Pt resting in bed,  A/Ox4, up to self, 20G right forearm patent, positive blood return, saline locked. Bone marrow bx done 12/22, results pending. POC discussed with patient, possible d/c home today. All needs met at this time. Safety precautions in place. Call light within reach, bed locked in low position. Hourly rounding in place.

## 2017-12-27 NOTE — PROGRESS NOTES
Report received from ANDIE Crowell and patient transferred to [facility]. A/O x4, denies pain. Pt states she is anxious waiting for the results of Bone Marrow Bx done 12/22/2017. 20G right arm Sl. Safety precautions in place, bed locked in low position, call light within reach. POC discussed and all questions answered at this time. Family and personal belongings present at bedside. Will continue to monitor with hourly rounding.

## 2017-12-28 ENCOUNTER — OUTPATIENT INFUSION SERVICES (OUTPATIENT)
Dept: ONCOLOGY | Facility: MEDICAL CENTER | Age: 72
End: 2017-12-28
Attending: INTERNAL MEDICINE
Payer: MEDICARE

## 2017-12-28 VITALS
OXYGEN SATURATION: 94 % | DIASTOLIC BLOOD PRESSURE: 64 MMHG | BODY MASS INDEX: 21.08 KG/M2 | HEART RATE: 97 BPM | RESPIRATION RATE: 18 BRPM | TEMPERATURE: 99 F | WEIGHT: 123.46 LBS | SYSTOLIC BLOOD PRESSURE: 136 MMHG | HEIGHT: 64 IN

## 2017-12-28 DIAGNOSIS — D64.9 ANEMIA, UNSPECIFIED TYPE: ICD-10-CM

## 2017-12-28 DIAGNOSIS — D69.6 THROMBOCYTOPENIA (HCC): ICD-10-CM

## 2017-12-28 LAB
ABO GROUP BLD: NORMAL
ANISOCYTOSIS BLD QL SMEAR: ABNORMAL
BASOPHILS # BLD AUTO: 0 % (ref 0–1.8)
BASOPHILS # BLD: 0 K/UL (ref 0–0.12)
BLD GP AB SCN SERPL QL: NORMAL
EOSINOPHIL # BLD AUTO: 0.42 K/UL (ref 0–0.51)
EOSINOPHIL NFR BLD: 0.9 % (ref 0–6.9)
ERYTHROCYTE [DISTWIDTH] IN BLOOD BY AUTOMATED COUNT: 85.2 FL (ref 35.9–50)
HCT VFR BLD AUTO: 32.8 % (ref 37–47)
HGB BLD-MCNC: 9.9 G/DL (ref 12–16)
LYMPHOCYTES # BLD AUTO: 30.09 K/UL (ref 1–4.8)
LYMPHOCYTES NFR BLD: 64.3 % (ref 22–41)
MACROCYTES BLD QL SMEAR: ABNORMAL
MANUAL DIFF BLD: NORMAL
MCH RBC QN AUTO: 33.2 PG (ref 27–33)
MCHC RBC AUTO-ENTMCNC: 30.2 G/DL (ref 33.6–35)
MCV RBC AUTO: 110.1 FL (ref 81.4–97.8)
METAMYELOCYTES NFR BLD MANUAL: 1.8 %
MICROCYTES BLD QL SMEAR: ABNORMAL
MONOCYTES # BLD AUTO: 0.42 K/UL (ref 0–0.85)
MONOCYTES NFR BLD AUTO: 0.9 % (ref 0–13.4)
MORPHOLOGY BLD-IMP: NORMAL
MYELOCYTES NFR BLD MANUAL: 3.6 %
NEUTROPHILS # BLD AUTO: 13.34 K/UL (ref 2–7.15)
NEUTROPHILS NFR BLD: 25 % (ref 44–72)
NEUTS BAND NFR BLD MANUAL: 3.5 % (ref 0–10)
NRBC # BLD AUTO: 0.32 K/UL
NRBC BLD-RTO: 0.7 /100 WBC
PLATELET # BLD AUTO: 25 K/UL (ref 164–446)
PLATELET BLD QL SMEAR: NORMAL
PLATELETS.RETICULATED NFR BLD AUTO: 9.5 K/UL (ref 0.6–13.1)
PMV BLD AUTO: 12.3 FL (ref 9–12.9)
POLYCHROMASIA BLD QL SMEAR: NORMAL
RBC # BLD AUTO: 2.98 M/UL (ref 4.2–5.4)
RBC BLD AUTO: PRESENT
RH BLD: NORMAL
WBC # BLD AUTO: 46.8 K/UL (ref 4.8–10.8)

## 2017-12-28 PROCEDURE — 85007 BL SMEAR W/DIFF WBC COUNT: CPT

## 2017-12-28 PROCEDURE — 36415 COLL VENOUS BLD VENIPUNCTURE: CPT

## 2017-12-28 PROCEDURE — 86901 BLOOD TYPING SEROLOGIC RH(D): CPT

## 2017-12-28 PROCEDURE — 85027 COMPLETE CBC AUTOMATED: CPT

## 2017-12-28 PROCEDURE — 86850 RBC ANTIBODY SCREEN: CPT

## 2017-12-28 PROCEDURE — 85055 RETICULATED PLATELET ASSAY: CPT

## 2017-12-28 PROCEDURE — 86900 BLOOD TYPING SEROLOGIC ABO: CPT

## 2017-12-28 RX ORDER — ACETAMINOPHEN 325 MG/1
650 TABLET ORAL ONCE
Status: CANCELLED | OUTPATIENT
Start: 2017-12-28 | End: 2017-12-28

## 2017-12-28 RX ORDER — SODIUM CHLORIDE 9 MG/ML
INJECTION, SOLUTION INTRAVENOUS ONCE
Status: CANCELLED | OUTPATIENT
Start: 2017-12-28 | End: 2017-12-28

## 2017-12-28 RX ORDER — SODIUM CHLORIDE 9 MG/ML
INJECTION, SOLUTION INTRAVENOUS ONCE
Status: CANCELLED | OUTPATIENT
Start: 2017-12-29 | End: 2017-12-29

## 2017-12-28 RX ORDER — ACETAMINOPHEN 325 MG/1
650 TABLET ORAL
Status: CANCELLED | OUTPATIENT
Start: 2018-01-01

## 2017-12-28 RX ORDER — SODIUM CHLORIDE 9 MG/ML
INJECTION, SOLUTION INTRAVENOUS ONCE
Status: CANCELLED | OUTPATIENT
Start: 2018-01-01 | End: 2017-12-30

## 2017-12-28 RX ORDER — ACETAMINOPHEN 325 MG/1
650 TABLET ORAL ONCE
Status: CANCELLED | OUTPATIENT
Start: 2017-12-29 | End: 2017-12-29

## 2017-12-28 RX ORDER — LIDOCAINE HYDROCHLORIDE 10 MG/ML
0.5 INJECTION, SOLUTION INFILTRATION; PERINEURAL PRN
Status: CANCELLED | OUTPATIENT
Start: 2017-12-29

## 2017-12-28 RX ORDER — LIDOCAINE HYDROCHLORIDE 10 MG/ML
0.5 INJECTION, SOLUTION INFILTRATION; PERINEURAL PRN
Status: CANCELLED | OUTPATIENT
Start: 2018-01-01

## 2017-12-28 RX ORDER — DIPHENHYDRAMINE HCL 25 MG
25 TABLET ORAL ONCE
Status: CANCELLED | OUTPATIENT
Start: 2017-12-29 | End: 2017-12-29

## 2017-12-28 RX ORDER — DIPHENHYDRAMINE HCL 25 MG
25 TABLET ORAL ONCE
Status: CANCELLED | OUTPATIENT
Start: 2018-01-01 | End: 2017-12-30

## 2017-12-28 RX ORDER — ACETAMINOPHEN 325 MG/1
650 TABLET ORAL
Status: CANCELLED | OUTPATIENT
Start: 2017-12-29

## 2017-12-28 RX ORDER — ACETAMINOPHEN 325 MG/1
650 TABLET ORAL
Status: CANCELLED | OUTPATIENT
Start: 2017-12-28

## 2017-12-28 RX ORDER — ACETAMINOPHEN 325 MG/1
650 TABLET ORAL ONCE
Status: CANCELLED | OUTPATIENT
Start: 2018-01-01 | End: 2017-12-30

## 2017-12-28 RX ORDER — DIPHENHYDRAMINE HCL 25 MG
25 TABLET ORAL ONCE
Status: CANCELLED | OUTPATIENT
Start: 2017-12-28 | End: 2017-12-28

## 2017-12-28 RX ORDER — LIDOCAINE HYDROCHLORIDE 10 MG/ML
0.5 INJECTION, SOLUTION INFILTRATION; PERINEURAL PRN
Status: CANCELLED | OUTPATIENT
Start: 2017-12-28

## 2017-12-28 ASSESSMENT — PAIN SCALES - GENERAL: PAINLEVEL: NO PAIN

## 2017-12-28 NOTE — DISCHARGE INSTRUCTIONS
Discharge Instructions    Discharged to home by car with relative. Discharged via wheelchair, hospital escort: Yes.  Special equipment needed: Not Applicable    Be sure to schedule a follow-up appointment with your primary care doctor or any specialists as instructed.     Discharge Plan:   Diet Plan: Discussed  Activity Level: Discussed  Confirmed Follow up Appointment: Appointment Scheduled  Confirmed Symptoms Management: Discussed  Medication Reconciliation Updated: Yes  Influenza Vaccine Indication: Patient Refuses    I understand that a diet low in cholesterol, fat, and sodium is recommended for good health. Unless I have been given specific instructions below for another diet, I accept this instruction as my diet prescription.   Other diet: Regular Diet    Special Instructions: None    · Is patient discharged on Warfarin / Coumadin?   No     Is patient Post Blood Transfusion?  Thrombocytopenia  Thrombocytopenia means there are not enough platelets in your blood. Platelets are tiny cells in your blood. When you start bleeding, platelets clump together around the cut or injury to stop the bleeding. This process is called blood clotting. Not having enough platelets can cause bleeding problems.  HOME CARE  Check your skin and inside your mouth for bruises or blood as told by your doctor.  Check your spit (sputum), pee (urine), and poop (stool) for blood as told by your doctor.  Do not do activities that can cause bumps or bruises until your doctor says it is okay.  Be careful not to cut yourself when you shave or use scissors, needles, knives, or other tools.  Be careful not to burn yourself when you iron or cook.  Ask your doctor if you can drink alcohol.  Only take medicines as told by your doctor.  Tell all your doctors and your dentist that you have this bleeding problem.  GET HELP RIGHT AWAY IF:  You are bleeding anywhere on your body.  You are bleeding or have bruises without knowing why.  You have blood in  your spit, pee, or poop.  MAKE SURE YOU:  Understand these instructions.  Will watch your condition.  Will get help right away if you are not doing well or get worse.     This information is not intended to replace advice given to you by your health care provider. Make sure you discuss any questions you have with your health care provider.     Document Released: 12/06/2012 Document Revised: 03/11/2013 Document Reviewed: 12/06/2012  [employer] Interactive Patient Education ©2016 [employer]  ·   Yes  POST BLOOD TRANSFUSION INFORMATION (ADULT)    The purpose of blood transfusion is to correct anemia, low levels of blood clotting factors or to correct acute blood loss.   Blood transfusion is very safe but occasionally unexpected adverse reactions do occur. Most adverse reactions occur during transfusion, within one to two days following transfusion or one to two weeks following transfusion. Some adverse reactions can occur one to six months after transfusion and some even years later.             If any of the symptoms listed below happen to you during your transfusion,                                 please notify your nurse immediately.   · Fever or Chills  · Flushing of the Face  · Hives, rash or itching  · Difficulty in breathing or shortness of breath  · Pain or oozing of blood from the IV needle site  · Low back pain  · Nausea or vomiting  · Weakness or fainting  · Chest pain  · Blood in the urine  · Decreased frequency of urination    The above symptoms may also occur within 24 to 48 after transfusion; if so, notify your physician.     · Yellowing of the skin    This can happen one to six months after transfusion; if so, notify your physician    Depression / Suicide Risk    As you are discharged from this [facility] facility, it is important to learn how to keep safe from harming yourself.    Recognize the warning signs:  · Abrupt changes in personality, positive or negative- including increase in energy    · Giving away possessions  · Change in eating patterns- significant weight changes-  positive or negative  · Change in sleeping patterns- unable to sleep or sleeping all the time   · Unwillingness or inability to communicate  · Depression  · Unusual sadness, discouragement and loneliness  · Talk of wanting to die  · Neglect of personal appearance   · Rebelliousness- reckless behavior  · Withdrawal from people/activities they love  · Confusion- inability to concentrate     If you or a loved one observes any of these behaviors or has concerns about self-harm, here's what you can do:  · Talk about it- your feelings and reasons for harming yourself  · Remove any means that you might use to hurt yourself (examples: pills, rope, extension cords, firearm)  · Get professional help from the community (Mental Health, Substance Abuse, psychological counseling)  · Do not be alone:Call your Safe Contact- someone whom you trust who will be there for you.  · Call your local CRISIS HOTLINE 919-5064 or 948-244-1760  · Call your local Children's Mobile Crisis Response Team [place] (342) 029-3480 or www.[employer]  · Call the toll free National Suicide Prevention Hotlines   · National Suicide Prevention Lifeline 920-265-QDCK (8673)  · National Hope Line Network 800-SUICIDE (992-4311)

## 2017-12-28 NOTE — PROGRESS NOTES
Spoke with Dr Martinez who states it is okay for pt to not get CBC drawn on 12/28 as the infusion center has no openings and pt can get a CBC with possible transfusion on Friday 12/29 instead and daily from then on. Infusion center aware and confirmed appt availabilities for 12/28. Pt updated in DC instructions

## 2017-12-28 NOTE — PROGRESS NOTES
All discharge instructions discussed with pt. Pt informed of diagnosis, follow-up, and home care instructions. Pt verbalized understanding, and all pt concerns and questions addressed. PIV removed.

## 2017-12-29 ENCOUNTER — OUTPATIENT INFUSION SERVICES (OUTPATIENT)
Dept: ONCOLOGY | Facility: MEDICAL CENTER | Age: 72
End: 2017-12-29
Attending: INTERNAL MEDICINE
Payer: MEDICARE

## 2017-12-29 VITALS
WEIGHT: 123.46 LBS | HEART RATE: 88 BPM | RESPIRATION RATE: 18 BRPM | BODY MASS INDEX: 21.08 KG/M2 | SYSTOLIC BLOOD PRESSURE: 126 MMHG | HEIGHT: 64 IN | DIASTOLIC BLOOD PRESSURE: 61 MMHG | TEMPERATURE: 98.4 F | OXYGEN SATURATION: 97 %

## 2017-12-29 DIAGNOSIS — D69.6 THROMBOCYTOPENIA (HCC): ICD-10-CM

## 2017-12-29 DIAGNOSIS — D64.9 ANEMIA, UNSPECIFIED TYPE: ICD-10-CM

## 2017-12-29 LAB
ANISOCYTOSIS BLD QL SMEAR: ABNORMAL
BASOPHILS # BLD AUTO: 1.8 % (ref 0–1.8)
BASOPHILS # BLD: 0.64 K/UL (ref 0–0.12)
EOSINOPHIL # BLD AUTO: 0 K/UL (ref 0–0.51)
EOSINOPHIL NFR BLD: 0 % (ref 0–6.9)
ERYTHROCYTE [DISTWIDTH] IN BLOOD BY AUTOMATED COUNT: 85 FL (ref 35.9–50)
HCT VFR BLD AUTO: 29.7 % (ref 37–47)
HGB BLD-MCNC: 9.1 G/DL (ref 12–16)
LYMPHOCYTES # BLD AUTO: 23.56 K/UL (ref 1–4.8)
LYMPHOCYTES NFR BLD: 65.8 % (ref 22–41)
MANUAL DIFF BLD: NORMAL
MCH RBC QN AUTO: 33.3 PG (ref 27–33)
MCHC RBC AUTO-ENTMCNC: 30.6 G/DL (ref 33.6–35)
MCV RBC AUTO: 108.8 FL (ref 81.4–97.8)
MICROCYTES BLD QL SMEAR: ABNORMAL
MONOCYTES # BLD AUTO: 2.18 K/UL (ref 0–0.85)
MONOCYTES NFR BLD AUTO: 6.1 % (ref 0–13.4)
MORPHOLOGY BLD-IMP: NORMAL
MYELOCYTES NFR BLD MANUAL: 2.6 %
NEUTROPHILS # BLD AUTO: 8.48 K/UL (ref 2–7.15)
NEUTROPHILS NFR BLD: 20.2 % (ref 44–72)
NEUTS BAND NFR BLD MANUAL: 3.5 % (ref 0–10)
NRBC # BLD AUTO: 0.22 K/UL
NRBC BLD-RTO: 0.6 /100 WBC
OVALOCYTES BLD QL SMEAR: NORMAL
PLATELET # BLD AUTO: 25 K/UL (ref 164–446)
PLATELET BLD QL SMEAR: NORMAL
PLATELETS.RETICULATED NFR BLD AUTO: 9.2 K/UL (ref 0.6–13.1)
PMV BLD AUTO: 11.8 FL (ref 9–12.9)
POLYCHROMASIA BLD QL SMEAR: NORMAL
RBC # BLD AUTO: 2.73 M/UL (ref 4.2–5.4)
RBC BLD AUTO: PRESENT
SCHISTOCYTES BLD QL SMEAR: NORMAL
VARIANT LYMPHS BLD QL SMEAR: NORMAL
WBC # BLD AUTO: 35.8 K/UL (ref 4.8–10.8)

## 2017-12-29 PROCEDURE — 85027 COMPLETE CBC AUTOMATED: CPT

## 2017-12-29 PROCEDURE — 85055 RETICULATED PLATELET ASSAY: CPT

## 2017-12-29 PROCEDURE — 85007 BL SMEAR W/DIFF WBC COUNT: CPT

## 2017-12-29 PROCEDURE — 36415 COLL VENOUS BLD VENIPUNCTURE: CPT

## 2017-12-29 ASSESSMENT — PAIN SCALES - GENERAL: PAINLEVEL: NO PAIN

## 2017-12-29 NOTE — PROGRESS NOTES
Patient here for CBC/COD and possible platelet/PRBC transfusion. Labs drawn using 23 gauge butterfly to left arm; gauze/coban applied to site. Dr. Martinez notified of WBC 35.8, Platelets 25K, and Hgb 9.1. Orders received to change frequency of patient's appointments. Patient to start coming in every other day starting 1/1/2018. Updated patient on plan of care. Reinforced education on bleeding precautions. Next appointment scheduled. Discharged to self care; no apparent distress noted.

## 2017-12-29 NOTE — PROGRESS NOTES
Pt presents ambulatory to IS for CBC/COD and possible platelet/PRBC transfusion.  POC discussed with patient and spouse, they verbalize understanding.  Labs drawn peripherally from [facility] without complication, site covered with gauze and coban.  Received critical lab result WBC 46.8 and platelets 25k.  Results reviewed, pt does not meet parameters for transfusion today.  Critical results reported to Dr. Martinez.  Per MD, pt to return tomorrow for repeat CBC.  If counts continue to trend upward, we may decrease frequency of patient's appointments in [facility].  MD aware of critical WBC, no additional orders received.  Pt and spouse updated on plan of care.  RN educated patient on bleeding precautions at home.  They verbalize understanding of teaching.  Next appointment time confirmed.  Pt discharged from IS in NAD.

## 2017-12-30 ENCOUNTER — APPOINTMENT (OUTPATIENT)
Dept: ONCOLOGY | Facility: MEDICAL CENTER | Age: 72
End: 2017-12-30
Attending: INTERNAL MEDICINE
Payer: MEDICARE

## 2018-01-01 ENCOUNTER — OUTPATIENT INFUSION SERVICES (OUTPATIENT)
Dept: ONCOLOGY | Facility: MEDICAL CENTER | Age: 73
End: 2018-01-01
Attending: INTERNAL MEDICINE
Payer: MEDICARE

## 2018-01-01 VITALS
RESPIRATION RATE: 18 BRPM | TEMPERATURE: 99 F | HEIGHT: 64 IN | HEART RATE: 87 BPM | DIASTOLIC BLOOD PRESSURE: 56 MMHG | OXYGEN SATURATION: 96 % | SYSTOLIC BLOOD PRESSURE: 141 MMHG | WEIGHT: 123.9 LBS | BODY MASS INDEX: 21.15 KG/M2

## 2018-01-01 DIAGNOSIS — D69.6 THROMBOCYTOPENIA (HCC): ICD-10-CM

## 2018-01-01 DIAGNOSIS — D64.9 ANEMIA, UNSPECIFIED TYPE: ICD-10-CM

## 2018-01-01 LAB
ABO GROUP BLD: NORMAL
ANISOCYTOSIS BLD QL SMEAR: ABNORMAL
BASOPHILS # BLD AUTO: 0 % (ref 0–1.8)
BASOPHILS # BLD: 0 K/UL (ref 0–0.12)
BLD GP AB SCN SERPL QL: NORMAL
EOSINOPHIL # BLD AUTO: 0 K/UL (ref 0–0.51)
EOSINOPHIL NFR BLD: 0 % (ref 0–6.9)
ERYTHROCYTE [DISTWIDTH] IN BLOOD BY AUTOMATED COUNT: 79.4 FL (ref 35.9–50)
HCT VFR BLD AUTO: 30.2 % (ref 37–47)
HGB BLD-MCNC: 9.5 G/DL (ref 12–16)
LYMPHOCYTES # BLD AUTO: 30.07 K/UL (ref 1–4.8)
LYMPHOCYTES NFR BLD: 60.5 % (ref 22–41)
MACROCYTES BLD QL SMEAR: ABNORMAL
MANUAL DIFF BLD: NORMAL
MCH RBC QN AUTO: 33.8 PG (ref 27–33)
MCHC RBC AUTO-ENTMCNC: 31.5 G/DL (ref 33.6–35)
MCV RBC AUTO: 107.5 FL (ref 81.4–97.8)
MICROCYTES BLD QL SMEAR: ABNORMAL
MONOCYTES # BLD AUTO: 2.19 K/UL (ref 0–0.85)
MONOCYTES NFR BLD AUTO: 4.4 % (ref 0–13.4)
MORPHOLOGY BLD-IMP: NORMAL
MYELOCYTES NFR BLD MANUAL: 1.8 %
NEUTROPHILS # BLD AUTO: 16.55 K/UL (ref 2–7.15)
NEUTROPHILS NFR BLD: 28.9 % (ref 44–72)
NEUTS BAND NFR BLD MANUAL: 4.4 % (ref 0–10)
NRBC # BLD AUTO: 0.21 K/UL
NRBC BLD-RTO: 0.4 /100 WBC
PLATELET # BLD AUTO: 32 K/UL (ref 164–446)
PLATELET BLD QL SMEAR: NORMAL
PLATELETS.RETICULATED NFR BLD AUTO: 11.3 K/UL (ref 0.6–13.1)
PMV BLD AUTO: 11.8 FL (ref 9–12.9)
POLYCHROMASIA BLD QL SMEAR: NORMAL
RBC # BLD AUTO: 2.81 M/UL (ref 4.2–5.4)
RBC BLD AUTO: PRESENT
RH BLD: NORMAL
WBC # BLD AUTO: 49.7 K/UL (ref 4.8–10.8)

## 2018-01-01 PROCEDURE — 36415 COLL VENOUS BLD VENIPUNCTURE: CPT

## 2018-01-01 PROCEDURE — 85027 COMPLETE CBC AUTOMATED: CPT

## 2018-01-01 PROCEDURE — 86900 BLOOD TYPING SEROLOGIC ABO: CPT

## 2018-01-01 PROCEDURE — 86850 RBC ANTIBODY SCREEN: CPT

## 2018-01-01 PROCEDURE — 85007 BL SMEAR W/DIFF WBC COUNT: CPT

## 2018-01-01 PROCEDURE — 86901 BLOOD TYPING SEROLOGIC RH(D): CPT

## 2018-01-01 PROCEDURE — 85055 RETICULATED PLATELET ASSAY: CPT

## 2018-01-01 RX ORDER — LIDOCAINE HYDROCHLORIDE 10 MG/ML
0.5 INJECTION, SOLUTION INFILTRATION; PERINEURAL PRN
Status: CANCELLED | OUTPATIENT
Start: 2018-01-08

## 2018-01-01 RX ORDER — ACETAMINOPHEN 325 MG/1
650 TABLET ORAL
Status: CANCELLED | OUTPATIENT
Start: 2018-01-05

## 2018-01-01 RX ORDER — SODIUM CHLORIDE 9 MG/ML
INJECTION, SOLUTION INTRAVENOUS ONCE
Status: CANCELLED | OUTPATIENT
Start: 2018-01-03 | End: 2019-01-03

## 2018-01-01 RX ORDER — LIDOCAINE HYDROCHLORIDE 10 MG/ML
0.5 INJECTION, SOLUTION INFILTRATION; PERINEURAL PRN
Status: CANCELLED | OUTPATIENT
Start: 2018-01-05

## 2018-01-01 RX ORDER — ACETAMINOPHEN 325 MG/1
650 TABLET ORAL
Status: CANCELLED | OUTPATIENT
Start: 2018-01-03

## 2018-01-01 RX ORDER — ACETAMINOPHEN 325 MG/1
650 TABLET ORAL
Status: CANCELLED | OUTPATIENT
Start: 2018-01-08

## 2018-01-01 RX ORDER — SODIUM CHLORIDE 9 MG/ML
INJECTION, SOLUTION INTRAVENOUS ONCE
Status: CANCELLED | OUTPATIENT
Start: 2018-01-05 | End: 2019-01-05

## 2018-01-01 RX ORDER — ACETAMINOPHEN 325 MG/1
650 TABLET ORAL ONCE
Status: CANCELLED | OUTPATIENT
Start: 2018-01-08 | End: 2019-01-08

## 2018-01-01 RX ORDER — LIDOCAINE HYDROCHLORIDE 10 MG/ML
0.5 INJECTION, SOLUTION INFILTRATION; PERINEURAL PRN
Status: CANCELLED | OUTPATIENT
Start: 2018-01-03

## 2018-01-01 RX ORDER — DIPHENHYDRAMINE HCL 25 MG
25 TABLET ORAL ONCE
Status: CANCELLED | OUTPATIENT
Start: 2018-01-08 | End: 2019-01-08

## 2018-01-01 RX ORDER — DIPHENHYDRAMINE HCL 25 MG
25 TABLET ORAL ONCE
Status: CANCELLED | OUTPATIENT
Start: 2018-01-05 | End: 2019-01-05

## 2018-01-01 RX ORDER — SODIUM CHLORIDE 9 MG/ML
INJECTION, SOLUTION INTRAVENOUS ONCE
Status: CANCELLED | OUTPATIENT
Start: 2018-01-08 | End: 2019-01-08

## 2018-01-01 RX ORDER — ASCORBIC ACID 500 MG
500 TABLET ORAL DAILY
COMMUNITY

## 2018-01-01 RX ORDER — ACETAMINOPHEN 325 MG/1
650 TABLET ORAL ONCE
Status: CANCELLED | OUTPATIENT
Start: 2018-01-05 | End: 2019-01-05

## 2018-01-01 RX ORDER — DIPHENHYDRAMINE HCL 25 MG
25 TABLET ORAL ONCE
Status: CANCELLED | OUTPATIENT
Start: 2018-01-03 | End: 2019-01-03

## 2018-01-01 RX ORDER — ACETAMINOPHEN 325 MG/1
650 TABLET ORAL ONCE
Status: CANCELLED | OUTPATIENT
Start: 2018-01-03 | End: 2019-01-03

## 2018-01-01 ASSESSMENT — PAIN SCALES - GENERAL: PAINLEVEL: NO PAIN

## 2018-01-01 NOTE — PROGRESS NOTES
Pt arrived to IS ambulatory, here with  for CBC/possible transfusion. IV access established and CBC drawn. CBC results available and pt not appropriate for transfusion today. Discussed lab results with pt. IV removed, pressure dressing applied. Pt knows when to return, printout of appts in hand. Pt discharged home under care of  in no apparent distress.

## 2018-01-02 ENCOUNTER — APPOINTMENT (OUTPATIENT)
Dept: ONCOLOGY | Facility: MEDICAL CENTER | Age: 73
End: 2018-01-02
Attending: INTERNAL MEDICINE
Payer: MEDICARE

## 2018-01-03 ENCOUNTER — OUTPATIENT INFUSION SERVICES (OUTPATIENT)
Dept: ONCOLOGY | Facility: MEDICAL CENTER | Age: 73
End: 2018-01-03
Attending: INTERNAL MEDICINE
Payer: MEDICARE

## 2018-01-03 VITALS
BODY MASS INDEX: 20.96 KG/M2 | HEIGHT: 64 IN | DIASTOLIC BLOOD PRESSURE: 48 MMHG | SYSTOLIC BLOOD PRESSURE: 122 MMHG | OXYGEN SATURATION: 98 % | TEMPERATURE: 98.7 F | HEART RATE: 87 BPM | RESPIRATION RATE: 18 BRPM | WEIGHT: 122.8 LBS

## 2018-01-03 DIAGNOSIS — D69.6 THROMBOCYTOPENIA DUE TO SEQUESTRATION (HCC): ICD-10-CM

## 2018-01-03 LAB
ANISOCYTOSIS BLD QL SMEAR: ABNORMAL
BASOPHILS # BLD AUTO: 0 % (ref 0–1.8)
BASOPHILS # BLD: 0 K/UL (ref 0–0.12)
EOSINOPHIL # BLD AUTO: 0 K/UL (ref 0–0.51)
EOSINOPHIL NFR BLD: 0 % (ref 0–6.9)
ERYTHROCYTE [DISTWIDTH] IN BLOOD BY AUTOMATED COUNT: 79.6 FL (ref 35.9–50)
HCT VFR BLD AUTO: 30.2 % (ref 37–47)
HGB BLD-MCNC: 9.5 G/DL (ref 12–16)
LYMPHOCYTES # BLD AUTO: 19.33 K/UL (ref 1–4.8)
LYMPHOCYTES NFR BLD: 47.5 % (ref 22–41)
MANUAL DIFF BLD: NORMAL
MCH RBC QN AUTO: 33.3 PG (ref 27–33)
MCHC RBC AUTO-ENTMCNC: 31.5 G/DL (ref 33.6–35)
MCV RBC AUTO: 106 FL (ref 81.4–97.8)
MICROCYTES BLD QL SMEAR: ABNORMAL
MONOCYTES # BLD AUTO: 2.77 K/UL (ref 0–0.85)
MONOCYTES NFR BLD AUTO: 6.8 % (ref 0–13.4)
MORPHOLOGY BLD-IMP: NORMAL
MYELOCYTES NFR BLD MANUAL: 5.9 %
NEUTROPHILS # BLD AUTO: 16.2 K/UL (ref 2–7.15)
NEUTROPHILS NFR BLD: 37.3 % (ref 44–72)
NEUTS BAND NFR BLD MANUAL: 2.5 % (ref 0–10)
NRBC # BLD AUTO: 0.23 K/UL
NRBC BLD-RTO: 0.6 /100 WBC
PLATELET # BLD AUTO: 33 K/UL (ref 164–446)
PLATELET BLD QL SMEAR: NORMAL
PLATELETS.RETICULATED NFR BLD AUTO: 9.4 K/UL (ref 0.6–13.1)
PMV BLD AUTO: 11.2 FL (ref 9–12.9)
POLYCHROMASIA BLD QL SMEAR: NORMAL
RBC # BLD AUTO: 2.85 M/UL (ref 4.2–5.4)
RBC BLD AUTO: PRESENT
WBC # BLD AUTO: 40.7 K/UL (ref 4.8–10.8)

## 2018-01-03 PROCEDURE — 85027 COMPLETE CBC AUTOMATED: CPT

## 2018-01-03 PROCEDURE — 85055 RETICULATED PLATELET ASSAY: CPT

## 2018-01-03 PROCEDURE — 85007 BL SMEAR W/DIFF WBC COUNT: CPT

## 2018-01-03 PROCEDURE — 36415 COLL VENOUS BLD VENIPUNCTURE: CPT

## 2018-01-03 ASSESSMENT — PAIN SCALES - GENERAL: PAINLEVEL: NO PAIN

## 2018-01-03 NOTE — PROGRESS NOTES
Patient presents for lab draw and possible blood / platelet transfusion. Reviewed plan of care, questions answered as needed. Call placed to Dr. Martinez about steroid prescription he states he will call it in. Hemoglobin 9.5 and platelets 33. All critical labs within parameters established by MD. Patient returns Friday and released in no acute distress with her .

## 2018-01-04 ENCOUNTER — APPOINTMENT (OUTPATIENT)
Dept: ONCOLOGY | Facility: MEDICAL CENTER | Age: 73
End: 2018-01-04
Attending: INTERNAL MEDICINE
Payer: MEDICARE

## 2018-01-05 ENCOUNTER — OUTPATIENT INFUSION SERVICES (OUTPATIENT)
Dept: ONCOLOGY | Facility: MEDICAL CENTER | Age: 73
End: 2018-01-05
Attending: INTERNAL MEDICINE
Payer: MEDICARE

## 2018-01-05 VITALS
WEIGHT: 122.8 LBS | HEART RATE: 80 BPM | RESPIRATION RATE: 18 BRPM | TEMPERATURE: 99 F | DIASTOLIC BLOOD PRESSURE: 59 MMHG | SYSTOLIC BLOOD PRESSURE: 127 MMHG | BODY MASS INDEX: 20.96 KG/M2 | OXYGEN SATURATION: 96 % | HEIGHT: 64 IN

## 2018-01-05 DIAGNOSIS — D64.9 ANEMIA, UNSPECIFIED TYPE: ICD-10-CM

## 2018-01-05 DIAGNOSIS — D69.6 THROMBOCYTOPENIA (HCC): ICD-10-CM

## 2018-01-05 LAB
ANISOCYTOSIS BLD QL SMEAR: ABNORMAL
BASOPHILS # BLD AUTO: 0 % (ref 0–1.8)
BASOPHILS # BLD: 0 K/UL (ref 0–0.12)
EOSINOPHIL # BLD AUTO: 0 K/UL (ref 0–0.51)
EOSINOPHIL NFR BLD: 0 % (ref 0–6.9)
ERYTHROCYTE [DISTWIDTH] IN BLOOD BY AUTOMATED COUNT: 76.7 FL (ref 35.9–50)
HCT VFR BLD AUTO: 30 % (ref 37–47)
HGB BLD-MCNC: 9.3 G/DL (ref 12–16)
LYMPHOCYTES # BLD AUTO: 38.6 K/UL (ref 1–4.8)
LYMPHOCYTES NFR BLD: 60.5 % (ref 22–41)
MACROCYTES BLD QL SMEAR: ABNORMAL
MANUAL DIFF BLD: NORMAL
MCH RBC QN AUTO: 33 PG (ref 27–33)
MCHC RBC AUTO-ENTMCNC: 31 G/DL (ref 33.6–35)
MCV RBC AUTO: 106.4 FL (ref 81.4–97.8)
METAMYELOCYTES NFR BLD MANUAL: 2.6 %
MONOCYTES # BLD AUTO: 3.96 K/UL (ref 0–0.85)
MONOCYTES NFR BLD AUTO: 6.2 % (ref 0–13.4)
MORPHOLOGY BLD-IMP: NORMAL
MYELOCYTES NFR BLD MANUAL: 0.9 %
NEUTROPHILS # BLD AUTO: 19.01 K/UL (ref 2–7.15)
NEUTROPHILS NFR BLD: 23.7 % (ref 44–72)
NEUTS BAND NFR BLD MANUAL: 6.1 % (ref 0–10)
NRBC # BLD AUTO: 0.17 K/UL
NRBC BLD-RTO: 0.3 /100 WBC
PATH REV: NORMAL
PATH REV: NORMAL
PLATELET # BLD AUTO: 35 K/UL (ref 164–446)
PLATELET BLD QL SMEAR: NORMAL
PLATELETS.RETICULATED NFR BLD AUTO: 8.5 K/UL (ref 0.6–13.1)
PMV BLD AUTO: 10.9 FL (ref 9–12.9)
POLYCHROMASIA BLD QL SMEAR: NORMAL
RBC # BLD AUTO: 2.82 M/UL (ref 4.2–5.4)
RBC BLD AUTO: PRESENT
WBC # BLD AUTO: 63.8 K/UL (ref 4.8–10.8)

## 2018-01-05 PROCEDURE — 85055 RETICULATED PLATELET ASSAY: CPT

## 2018-01-05 PROCEDURE — 80500 HCHG CLINICAL PATH CONSULT-LIMITED: CPT

## 2018-01-05 PROCEDURE — 85007 BL SMEAR W/DIFF WBC COUNT: CPT

## 2018-01-05 PROCEDURE — 85027 COMPLETE CBC AUTOMATED: CPT

## 2018-01-05 PROCEDURE — 36415 COLL VENOUS BLD VENIPUNCTURE: CPT

## 2018-01-05 ASSESSMENT — PAIN SCALES - GENERAL: PAINLEVEL: NO PAIN

## 2018-01-06 ENCOUNTER — APPOINTMENT (OUTPATIENT)
Dept: ONCOLOGY | Facility: MEDICAL CENTER | Age: 73
End: 2018-01-06
Attending: INTERNAL MEDICINE
Payer: MEDICARE

## 2018-01-06 NOTE — PROGRESS NOTES
Pt returns to infusion center for scheduled CBC and possible transfusion.  Labs drawn via #23 butterfly needle to Lt FA without incident.  Results reviewed and called to Dr. Bowden.  No orders received and patient does not meet parameters for transfusion.  Pt returns to infusion center on Monday for labs and sees Dr. Martinez later that afternoon to discuss results from bone marrow biopsy done on 12/22/17.  Emotional support provided.  Pt left infusion center ambulatory and in good condition.

## 2018-01-07 ENCOUNTER — APPOINTMENT (OUTPATIENT)
Dept: ONCOLOGY | Facility: MEDICAL CENTER | Age: 73
End: 2018-01-07
Attending: INTERNAL MEDICINE
Payer: MEDICARE

## 2018-01-08 ENCOUNTER — OUTPATIENT INFUSION SERVICES (OUTPATIENT)
Dept: ONCOLOGY | Facility: MEDICAL CENTER | Age: 73
End: 2018-01-08
Attending: INTERNAL MEDICINE
Payer: MEDICARE

## 2018-01-08 VITALS
DIASTOLIC BLOOD PRESSURE: 46 MMHG | OXYGEN SATURATION: 95 % | BODY MASS INDEX: 21.15 KG/M2 | WEIGHT: 123.9 LBS | HEART RATE: 108 BPM | TEMPERATURE: 99.3 F | RESPIRATION RATE: 18 BRPM | HEIGHT: 64 IN | SYSTOLIC BLOOD PRESSURE: 144 MMHG

## 2018-01-08 DIAGNOSIS — D64.9 ANEMIA, UNSPECIFIED TYPE: ICD-10-CM

## 2018-01-08 DIAGNOSIS — D69.6 THROMBOCYTOPENIA (HCC): ICD-10-CM

## 2018-01-08 LAB
ANISOCYTOSIS BLD QL SMEAR: ABNORMAL
BASOPHILS # BLD AUTO: 0 % (ref 0–1.8)
BASOPHILS # BLD: 0 K/UL (ref 0–0.12)
EOSINOPHIL # BLD AUTO: 1.57 K/UL (ref 0–0.51)
EOSINOPHIL NFR BLD: 2.6 % (ref 0–6.9)
ERYTHROCYTE [DISTWIDTH] IN BLOOD BY AUTOMATED COUNT: 76.3 FL (ref 35.9–50)
HCT VFR BLD AUTO: 31.1 % (ref 37–47)
HGB BLD-MCNC: 9.5 G/DL (ref 12–16)
LYMPHOCYTES # BLD AUTO: 37.33 K/UL (ref 1–4.8)
LYMPHOCYTES NFR BLD: 61.7 % (ref 22–41)
MACROCYTES BLD QL SMEAR: ABNORMAL
MANUAL DIFF BLD: NORMAL
MCH RBC QN AUTO: 32.9 PG (ref 27–33)
MCHC RBC AUTO-ENTMCNC: 30.5 G/DL (ref 33.6–35)
MCV RBC AUTO: 107.6 FL (ref 81.4–97.8)
METAMYELOCYTES NFR BLD MANUAL: 2.6 %
MONOCYTES # BLD AUTO: 1.57 K/UL (ref 0–0.85)
MONOCYTES NFR BLD AUTO: 2.6 % (ref 0–13.4)
MORPHOLOGY BLD-IMP: NORMAL
MYELOCYTES NFR BLD MANUAL: 0.9 %
NEUTROPHILS # BLD AUTO: 17.91 K/UL (ref 2–7.15)
NEUTROPHILS NFR BLD: 24.4 % (ref 44–72)
NEUTS BAND NFR BLD MANUAL: 5.2 % (ref 0–10)
NRBC # BLD AUTO: 0.21 K/UL
NRBC BLD-RTO: 0.3 /100 WBC
PLATELET # BLD AUTO: 35 K/UL (ref 164–446)
PLATELET BLD QL SMEAR: NORMAL
PLATELETS.RETICULATED NFR BLD AUTO: 10.6 K/UL (ref 0.6–13.1)
PMV BLD AUTO: 10.8 FL (ref 9–12.9)
RBC # BLD AUTO: 2.89 M/UL (ref 4.2–5.4)
RBC BLD AUTO: PRESENT
WBC # BLD AUTO: 60.5 K/UL (ref 4.8–10.8)

## 2018-01-08 PROCEDURE — 36415 COLL VENOUS BLD VENIPUNCTURE: CPT

## 2018-01-08 PROCEDURE — 85027 COMPLETE CBC AUTOMATED: CPT

## 2018-01-08 PROCEDURE — 85007 BL SMEAR W/DIFF WBC COUNT: CPT

## 2018-01-08 PROCEDURE — 85055 RETICULATED PLATELET ASSAY: CPT

## 2018-01-08 PROCEDURE — 306780 HCHG STAT FOR TRANSFUSION PER CASE

## 2018-01-08 ASSESSMENT — PAIN SCALES - GENERAL: PAINLEVEL: NO PAIN

## 2018-01-08 NOTE — PROGRESS NOTES
Pt arrived for CBC/possible blood transfusion;  accompanied. Pt reports having appt with Dr. Martinez today at 1530.  Labs drawn from [facility] with 25G butterfly, pressure dressing applied.  Pt anxious to get to appt; preliminary results reviewed.  Not indicated for transfusion per parameters. Print out of prelim results given to patient.  Pt discharged with  to Dr. Martinez's office in no apparent distress.  Final results faxed to Dr. Martinez.  Pt unsure if next appts will be needed; will notify Infusion of any changes in plan of care.

## 2018-01-10 ENCOUNTER — APPOINTMENT (OUTPATIENT)
Dept: ONCOLOGY | Facility: MEDICAL CENTER | Age: 73
End: 2018-01-10
Attending: INTERNAL MEDICINE
Payer: MEDICARE

## 2018-01-12 ENCOUNTER — OUTPATIENT INFUSION SERVICES (OUTPATIENT)
Dept: ONCOLOGY | Facility: MEDICAL CENTER | Age: 73
End: 2018-01-12
Attending: INTERNAL MEDICINE
Payer: MEDICARE

## 2018-01-12 VITALS
HEART RATE: 81 BPM | RESPIRATION RATE: 18 BRPM | TEMPERATURE: 99 F | SYSTOLIC BLOOD PRESSURE: 130 MMHG | DIASTOLIC BLOOD PRESSURE: 57 MMHG | HEIGHT: 64 IN | WEIGHT: 123.24 LBS | OXYGEN SATURATION: 98 % | BODY MASS INDEX: 21.04 KG/M2

## 2018-01-12 DIAGNOSIS — D69.6 THROMBOCYTOPENIA (HCC): ICD-10-CM

## 2018-01-12 DIAGNOSIS — D64.9 ANEMIA, UNSPECIFIED TYPE: ICD-10-CM

## 2018-01-12 LAB
ANISOCYTOSIS BLD QL SMEAR: ABNORMAL
BASOPHILS # BLD AUTO: 0 % (ref 0–1.8)
BASOPHILS # BLD: 0 K/UL (ref 0–0.12)
EOSINOPHIL # BLD AUTO: 0.49 K/UL (ref 0–0.51)
EOSINOPHIL NFR BLD: 0.8 % (ref 0–6.9)
ERYTHROCYTE [DISTWIDTH] IN BLOOD BY AUTOMATED COUNT: 75 FL (ref 35.9–50)
HCT VFR BLD AUTO: 31.5 % (ref 37–47)
HGB BLD-MCNC: 9.9 G/DL (ref 12–16)
LYMPHOCYTES # BLD AUTO: 40.4 K/UL (ref 1–4.8)
LYMPHOCYTES NFR BLD: 65.8 % (ref 22–41)
MACROCYTES BLD QL SMEAR: ABNORMAL
MANUAL DIFF BLD: NORMAL
MCH RBC QN AUTO: 33.4 PG (ref 27–33)
MCHC RBC AUTO-ENTMCNC: 31.4 G/DL (ref 33.6–35)
MCV RBC AUTO: 106.4 FL (ref 81.4–97.8)
METAMYELOCYTES NFR BLD MANUAL: 1.7 %
MONOCYTES # BLD AUTO: 1.54 K/UL (ref 0–0.85)
MONOCYTES NFR BLD AUTO: 2.5 % (ref 0–13.4)
MORPHOLOGY BLD-IMP: NORMAL
NEUTROPHILS # BLD AUTO: 17.44 K/UL (ref 2–7.15)
NEUTROPHILS NFR BLD: 24.2 % (ref 44–72)
NEUTS BAND NFR BLD MANUAL: 4.2 % (ref 0–10)
NRBC # BLD AUTO: 0.3 K/UL
NRBC BLD-RTO: 0.5 /100 WBC
PLATELET # BLD AUTO: 39 K/UL (ref 164–446)
PLATELET BLD QL SMEAR: NORMAL
PLATELETS.RETICULATED NFR BLD AUTO: 10.3 K/UL (ref 0.6–13.1)
PMV BLD AUTO: 10.4 FL (ref 9–12.9)
POLYCHROMASIA BLD QL SMEAR: NORMAL
PROMYELOCYTES NFR BLD MANUAL: 0.8 %
RBC # BLD AUTO: 2.96 M/UL (ref 4.2–5.4)
RBC BLD AUTO: PRESENT
WBC # BLD AUTO: 61.4 K/UL (ref 4.8–10.8)

## 2018-01-12 PROCEDURE — 85007 BL SMEAR W/DIFF WBC COUNT: CPT

## 2018-01-12 PROCEDURE — 85055 RETICULATED PLATELET ASSAY: CPT

## 2018-01-12 PROCEDURE — 85027 COMPLETE CBC AUTOMATED: CPT

## 2018-01-12 PROCEDURE — 36415 COLL VENOUS BLD VENIPUNCTURE: CPT

## 2018-01-12 RX ORDER — LIDOCAINE HYDROCHLORIDE 10 MG/ML
0.5 INJECTION, SOLUTION INFILTRATION; PERINEURAL PRN
Status: CANCELLED | OUTPATIENT
Start: 2018-01-15

## 2018-01-12 RX ORDER — ACETAMINOPHEN 325 MG/1
650 TABLET ORAL
Status: CANCELLED | OUTPATIENT
Start: 2018-01-17

## 2018-01-12 RX ORDER — ACETAMINOPHEN 325 MG/1
650 TABLET ORAL
Status: CANCELLED | OUTPATIENT
Start: 2018-01-15

## 2018-01-12 RX ORDER — LIDOCAINE HYDROCHLORIDE 10 MG/ML
0.5 INJECTION, SOLUTION INFILTRATION; PERINEURAL PRN
Status: CANCELLED | OUTPATIENT
Start: 2018-01-12

## 2018-01-12 RX ORDER — SODIUM CHLORIDE 9 MG/ML
INJECTION, SOLUTION INTRAVENOUS ONCE
Status: CANCELLED | OUTPATIENT
Start: 2018-01-17 | End: 2018-01-17

## 2018-01-12 RX ORDER — DIPHENHYDRAMINE HCL 25 MG
25 TABLET ORAL ONCE
Status: CANCELLED | OUTPATIENT
Start: 2018-01-12 | End: 2018-01-12

## 2018-01-12 RX ORDER — DIPHENHYDRAMINE HCL 25 MG
25 TABLET ORAL ONCE
Status: CANCELLED | OUTPATIENT
Start: 2018-01-15 | End: 2018-01-14

## 2018-01-12 RX ORDER — ACETAMINOPHEN 325 MG/1
650 TABLET ORAL ONCE
Status: CANCELLED | OUTPATIENT
Start: 2018-01-17 | End: 2018-01-17

## 2018-01-12 RX ORDER — SODIUM CHLORIDE 9 MG/ML
INJECTION, SOLUTION INTRAVENOUS ONCE
Status: CANCELLED | OUTPATIENT
Start: 2018-01-15 | End: 2018-01-14

## 2018-01-12 RX ORDER — DIPHENHYDRAMINE HCL 25 MG
25 TABLET ORAL ONCE
Status: CANCELLED | OUTPATIENT
Start: 2018-01-17 | End: 2018-01-17

## 2018-01-12 RX ORDER — SODIUM CHLORIDE 9 MG/ML
INJECTION, SOLUTION INTRAVENOUS ONCE
Status: CANCELLED | OUTPATIENT
Start: 2018-01-12 | End: 2018-01-12

## 2018-01-12 RX ORDER — ACETAMINOPHEN 325 MG/1
650 TABLET ORAL
Status: CANCELLED | OUTPATIENT
Start: 2018-01-12

## 2018-01-12 RX ORDER — ACETAMINOPHEN 325 MG/1
650 TABLET ORAL ONCE
Status: CANCELLED | OUTPATIENT
Start: 2018-01-12 | End: 2018-01-12

## 2018-01-12 RX ORDER — LIDOCAINE HYDROCHLORIDE 10 MG/ML
0.5 INJECTION, SOLUTION INFILTRATION; PERINEURAL PRN
Status: CANCELLED | OUTPATIENT
Start: 2018-01-17

## 2018-01-12 RX ORDER — ACETAMINOPHEN 325 MG/1
650 TABLET ORAL ONCE
Status: CANCELLED | OUTPATIENT
Start: 2018-01-15 | End: 2018-01-14

## 2018-01-12 ASSESSMENT — PAIN SCALES - GENERAL: PAINLEVEL: NO PAIN

## 2018-01-12 NOTE — PROGRESS NOTES
Patient arrived for CBC/possible transfusion.  She had a new complaint of some hip pain around the area of her last Bmbx, she stated she had mentioned to Dr. Martinez Monday, when there was less pain; she was instructed to call Dr. Martinez directly to get a possible answer, patient acknowledged understanding.  Otherwise, no s/sx of infection or any other complaints. CBC drawn with a butterfly and sent to lab.  Results of HGB 9.9 and PLT 39 received, within parameters, patient sent home ambulatory and scheduled to return on 1/15/18.

## 2018-01-15 ENCOUNTER — OUTPATIENT INFUSION SERVICES (OUTPATIENT)
Dept: ONCOLOGY | Facility: MEDICAL CENTER | Age: 73
End: 2018-01-15
Attending: INTERNAL MEDICINE
Payer: MEDICARE

## 2018-01-15 VITALS
TEMPERATURE: 99.4 F | OXYGEN SATURATION: 98 % | SYSTOLIC BLOOD PRESSURE: 125 MMHG | RESPIRATION RATE: 18 BRPM | HEIGHT: 64 IN | HEART RATE: 96 BPM | BODY MASS INDEX: 20.85 KG/M2 | DIASTOLIC BLOOD PRESSURE: 45 MMHG | WEIGHT: 122.14 LBS

## 2018-01-15 DIAGNOSIS — D69.6 THROMBOCYTOPENIA (HCC): ICD-10-CM

## 2018-01-15 DIAGNOSIS — D64.9 ANEMIA, UNSPECIFIED TYPE: ICD-10-CM

## 2018-01-15 LAB
ANISOCYTOSIS BLD QL SMEAR: ABNORMAL
BASOPHILS # BLD AUTO: 0.8 % (ref 0–1.8)
BASOPHILS # BLD: 0.47 K/UL (ref 0–0.12)
EOSINOPHIL # BLD AUTO: 0.47 K/UL (ref 0–0.51)
EOSINOPHIL NFR BLD: 0.8 % (ref 0–6.9)
ERYTHROCYTE [DISTWIDTH] IN BLOOD BY AUTOMATED COUNT: 73.9 FL (ref 35.9–50)
HCT VFR BLD AUTO: 29.3 % (ref 37–47)
HGB BLD-MCNC: 9.1 G/DL (ref 12–16)
LYMPHOCYTES # BLD AUTO: 37.56 K/UL (ref 1–4.8)
LYMPHOCYTES NFR BLD: 64.1 % (ref 22–41)
MACROCYTES BLD QL SMEAR: ABNORMAL
MANUAL DIFF BLD: NORMAL
MCH RBC QN AUTO: 32.7 PG (ref 27–33)
MCHC RBC AUTO-ENTMCNC: 31.1 G/DL (ref 33.6–35)
MCV RBC AUTO: 105.4 FL (ref 81.4–97.8)
METAMYELOCYTES NFR BLD MANUAL: 0.9 %
MONOCYTES # BLD AUTO: 1.52 K/UL (ref 0–0.85)
MONOCYTES NFR BLD AUTO: 2.6 % (ref 0–13.4)
MORPHOLOGY BLD-IMP: NORMAL
MYELOCYTES NFR BLD MANUAL: 0.9 %
NEUTROPHILS # BLD AUTO: 17.52 K/UL (ref 2–7.15)
NEUTROPHILS NFR BLD: 29.1 % (ref 44–72)
NEUTS BAND NFR BLD MANUAL: 0.8 % (ref 0–10)
NRBC # BLD AUTO: 0.33 K/UL
NRBC BLD-RTO: 0.6 /100 WBC
PLATELET # BLD AUTO: 38 K/UL (ref 164–446)
PLATELET BLD QL SMEAR: NORMAL
PLATELETS.RETICULATED NFR BLD AUTO: 7.7 K/UL (ref 0.6–13.1)
PMV BLD AUTO: 11 FL (ref 9–12.9)
POLYCHROMASIA BLD QL SMEAR: NORMAL
RBC # BLD AUTO: 2.78 M/UL (ref 4.2–5.4)
RBC BLD AUTO: PRESENT
WBC # BLD AUTO: 58.6 K/UL (ref 4.8–10.8)

## 2018-01-15 PROCEDURE — 85027 COMPLETE CBC AUTOMATED: CPT

## 2018-01-15 PROCEDURE — 85007 BL SMEAR W/DIFF WBC COUNT: CPT

## 2018-01-15 PROCEDURE — 85055 RETICULATED PLATELET ASSAY: CPT

## 2018-01-15 PROCEDURE — 36415 COLL VENOUS BLD VENIPUNCTURE: CPT

## 2018-01-15 ASSESSMENT — PAIN SCALES - GENERAL: PAINLEVEL: 6=MODERATE PAIN

## 2018-01-15 NOTE — PROGRESS NOTES
Patient here for CBC and possible transfusion. Labs drawn using 23 gauge butterfly to right arm; gauze/coban applied to site. Patient does not meet parameters for blood transfusion today. Patient to have a PET scan tomorrow. Next appointment scheduled. Patient stated that appointment on 1/17/18 may be cancelled by MD. Patient states she will be here for her appointment on Friday 01/19/18. Discharged to self care; no apparent distress noted.

## 2018-01-16 ENCOUNTER — HOSPITAL ENCOUNTER (OUTPATIENT)
Dept: RADIOLOGY | Facility: MEDICAL CENTER | Age: 73
End: 2018-01-16
Attending: INTERNAL MEDICINE
Payer: MEDICARE

## 2018-01-16 DIAGNOSIS — C83.30 RETICULOSARCOMA (HCC): ICD-10-CM

## 2018-01-16 PROCEDURE — 78815 PET IMAGE W/CT SKULL-THIGH: CPT

## 2018-01-17 ENCOUNTER — OUTPATIENT INFUSION SERVICES (OUTPATIENT)
Dept: ONCOLOGY | Facility: MEDICAL CENTER | Age: 73
End: 2018-01-17
Attending: INTERNAL MEDICINE
Payer: MEDICARE

## 2018-01-17 VITALS
WEIGHT: 121.69 LBS | DIASTOLIC BLOOD PRESSURE: 49 MMHG | TEMPERATURE: 99.2 F | OXYGEN SATURATION: 97 % | RESPIRATION RATE: 18 BRPM | SYSTOLIC BLOOD PRESSURE: 147 MMHG | HEIGHT: 64 IN | HEART RATE: 93 BPM | BODY MASS INDEX: 20.78 KG/M2

## 2018-01-17 DIAGNOSIS — D69.6 THROMBOCYTOPENIA (HCC): ICD-10-CM

## 2018-01-17 DIAGNOSIS — D64.9 ANEMIA, UNSPECIFIED TYPE: ICD-10-CM

## 2018-01-17 LAB
ANISOCYTOSIS BLD QL SMEAR: ABNORMAL
BASOPHILS # BLD AUTO: 0 % (ref 0–1.8)
BASOPHILS # BLD: 0 K/UL (ref 0–0.12)
EOSINOPHIL # BLD AUTO: 0.52 K/UL (ref 0–0.51)
EOSINOPHIL NFR BLD: 0.9 % (ref 0–6.9)
ERYTHROCYTE [DISTWIDTH] IN BLOOD BY AUTOMATED COUNT: 75.4 FL (ref 35.9–50)
HCT VFR BLD AUTO: 29.9 % (ref 37–47)
HGB BLD-MCNC: 9.5 G/DL (ref 12–16)
LYMPHOCYTES # BLD AUTO: 39.64 K/UL (ref 1–4.8)
LYMPHOCYTES NFR BLD: 68.7 % (ref 22–41)
MACROCYTES BLD QL SMEAR: ABNORMAL
MANUAL DIFF BLD: NORMAL
MCH RBC QN AUTO: 33.8 PG (ref 27–33)
MCHC RBC AUTO-ENTMCNC: 31.8 G/DL (ref 33.6–35)
MCV RBC AUTO: 106.4 FL (ref 81.4–97.8)
METAMYELOCYTES NFR BLD MANUAL: 1.7 %
MICROCYTES BLD QL SMEAR: ABNORMAL
MONOCYTES # BLD AUTO: 0 K/UL (ref 0–0.85)
MONOCYTES NFR BLD AUTO: 0 % (ref 0–13.4)
MORPHOLOGY BLD-IMP: NORMAL
MYELOCYTES NFR BLD MANUAL: 3.5 %
NEUTROPHILS # BLD AUTO: 14.02 K/UL (ref 2–7.15)
NEUTROPHILS NFR BLD: 24.3 % (ref 44–72)
NRBC # BLD AUTO: 0.44 K/UL
NRBC BLD-RTO: 0.8 /100 WBC
PLATELET # BLD AUTO: 38 K/UL (ref 164–446)
PLATELETS.RETICULATED NFR BLD AUTO: 9.3 K/UL (ref 0.6–13.1)
PMV BLD AUTO: 10.9 FL (ref 9–12.9)
POIKILOCYTOSIS BLD QL SMEAR: NORMAL
POLYCHROMASIA BLD QL SMEAR: NORMAL
PROMYELOCYTES NFR BLD MANUAL: 0.9 %
RBC # BLD AUTO: 2.81 M/UL (ref 4.2–5.4)
RBC BLD AUTO: PRESENT
STOMATOCYTES BLD QL SMEAR: NORMAL
WBC # BLD AUTO: 57.7 K/UL (ref 4.8–10.8)

## 2018-01-17 PROCEDURE — 85027 COMPLETE CBC AUTOMATED: CPT

## 2018-01-17 PROCEDURE — 36415 COLL VENOUS BLD VENIPUNCTURE: CPT

## 2018-01-17 PROCEDURE — 85055 RETICULATED PLATELET ASSAY: CPT

## 2018-01-17 PROCEDURE — 85007 BL SMEAR W/DIFF WBC COUNT: CPT

## 2018-01-17 RX ORDER — SODIUM CHLORIDE 9 MG/ML
INJECTION, SOLUTION INTRAVENOUS ONCE
Status: CANCELLED | OUTPATIENT
Start: 2018-01-19 | End: 2018-01-19

## 2018-01-17 RX ORDER — ACETAMINOPHEN 325 MG/1
650 TABLET ORAL
Status: CANCELLED | OUTPATIENT
Start: 2018-01-19

## 2018-01-17 RX ORDER — LIDOCAINE HYDROCHLORIDE 10 MG/ML
0.5 INJECTION, SOLUTION INFILTRATION; PERINEURAL PRN
Status: CANCELLED | OUTPATIENT
Start: 2018-01-19

## 2018-01-17 RX ORDER — ACETAMINOPHEN 325 MG/1
650 TABLET ORAL ONCE
Status: CANCELLED | OUTPATIENT
Start: 2018-01-19 | End: 2018-01-19

## 2018-01-17 RX ORDER — DIPHENHYDRAMINE HCL 25 MG
25 TABLET ORAL ONCE
Status: CANCELLED | OUTPATIENT
Start: 2018-01-19 | End: 2018-01-19

## 2018-01-17 ASSESSMENT — PAIN SCALES - GENERAL: PAINLEVEL: 4=SLIGHT-MODERATE PAIN

## 2018-01-17 NOTE — PROGRESS NOTES
Patient here for CBC and possible transfusion. Labs drawn using 23 gauge butterfly to left arm; gauze/coban applied to site. Patient does not meet parameters for blood transfusion today. Next appointment scheduled. Discharged to self care; no apparent distress noted.

## 2018-01-19 ENCOUNTER — OUTPATIENT INFUSION SERVICES (OUTPATIENT)
Dept: ONCOLOGY | Facility: MEDICAL CENTER | Age: 73
End: 2018-01-19
Attending: INTERNAL MEDICINE
Payer: MEDICARE

## 2018-01-19 VITALS
OXYGEN SATURATION: 97 % | WEIGHT: 123.9 LBS | HEIGHT: 64 IN | DIASTOLIC BLOOD PRESSURE: 44 MMHG | HEART RATE: 97 BPM | TEMPERATURE: 98.7 F | SYSTOLIC BLOOD PRESSURE: 128 MMHG | BODY MASS INDEX: 21.15 KG/M2 | RESPIRATION RATE: 18 BRPM

## 2018-01-19 DIAGNOSIS — D64.9 ANEMIA, UNSPECIFIED TYPE: ICD-10-CM

## 2018-01-19 DIAGNOSIS — D69.6 THROMBOCYTOPENIA (HCC): ICD-10-CM

## 2018-01-19 LAB
ABO GROUP BLD: NORMAL
ANISOCYTOSIS BLD QL SMEAR: ABNORMAL
BASOPHILS # BLD AUTO: 0.9 % (ref 0–1.8)
BASOPHILS # BLD: 0.42 K/UL (ref 0–0.12)
BLD GP AB SCN SERPL QL: NORMAL
EOSINOPHIL # BLD AUTO: 0.8 K/UL (ref 0–0.51)
EOSINOPHIL NFR BLD: 1.7 % (ref 0–6.9)
ERYTHROCYTE [DISTWIDTH] IN BLOOD BY AUTOMATED COUNT: 78.4 FL (ref 35.9–50)
HCT VFR BLD AUTO: 30.4 % (ref 37–47)
HGB BLD-MCNC: 9.4 G/DL (ref 12–16)
LYMPHOCYTES # BLD AUTO: 30.79 K/UL (ref 1–4.8)
LYMPHOCYTES NFR BLD: 65.8 % (ref 22–41)
MACROCYTES BLD QL SMEAR: ABNORMAL
MANUAL DIFF BLD: NORMAL
MCH RBC QN AUTO: 33.7 PG (ref 27–33)
MCHC RBC AUTO-ENTMCNC: 30.9 G/DL (ref 33.6–35)
MCV RBC AUTO: 109 FL (ref 81.4–97.8)
METAMYELOCYTES NFR BLD MANUAL: 1.7 %
MONOCYTES # BLD AUTO: 1.59 K/UL (ref 0–0.85)
MONOCYTES NFR BLD AUTO: 3.4 % (ref 0–13.4)
MORPHOLOGY BLD-IMP: NORMAL
MYELOCYTES NFR BLD MANUAL: 1.7 %
NEUTROPHILS # BLD AUTO: 11.61 K/UL (ref 2–7.15)
NEUTROPHILS NFR BLD: 22.2 % (ref 44–72)
NEUTS BAND NFR BLD MANUAL: 2.6 % (ref 0–10)
NRBC # BLD AUTO: 0.41 K/UL
NRBC BLD-RTO: 0.9 /100 WBC
PLATELET # BLD AUTO: 39 K/UL (ref 164–446)
PLATELET BLD QL SMEAR: NORMAL
PLATELETS.RETICULATED NFR BLD AUTO: 9 K/UL (ref 0.6–13.1)
PMV BLD AUTO: 11.1 FL (ref 9–12.9)
POLYCHROMASIA BLD QL SMEAR: NORMAL
RBC # BLD AUTO: 2.79 M/UL (ref 4.2–5.4)
RBC BLD AUTO: PRESENT
RH BLD: NORMAL
WBC # BLD AUTO: 46.8 K/UL (ref 4.8–10.8)

## 2018-01-19 PROCEDURE — 86900 BLOOD TYPING SEROLOGIC ABO: CPT

## 2018-01-19 PROCEDURE — 36415 COLL VENOUS BLD VENIPUNCTURE: CPT

## 2018-01-19 PROCEDURE — 86901 BLOOD TYPING SEROLOGIC RH(D): CPT

## 2018-01-19 PROCEDURE — 85055 RETICULATED PLATELET ASSAY: CPT

## 2018-01-19 PROCEDURE — 85007 BL SMEAR W/DIFF WBC COUNT: CPT

## 2018-01-19 PROCEDURE — 86850 RBC ANTIBODY SCREEN: CPT

## 2018-01-19 PROCEDURE — 85027 COMPLETE CBC AUTOMATED: CPT

## 2018-01-19 ASSESSMENT — PAIN SCALES - GENERAL: PAINLEVEL: 1=MINIMAL PAIN

## 2018-01-19 NOTE — PROGRESS NOTES
Patient arrived for CBC/possible blood transfusion. Pt reports no changes or concerns.  States she is meeting with MD later today to review plan of care; believes she is to start chemo here next Wednesday.  Lab drawn from [facility] with 25G butterfly. Results reviewed, within parameters for patient.  No transfusion indicated today.  Pt discharged home with  in great spirits under no apparent distress.

## 2018-01-22 ENCOUNTER — APPOINTMENT (OUTPATIENT)
Dept: ONCOLOGY | Facility: MEDICAL CENTER | Age: 73
End: 2018-01-22
Attending: INTERNAL MEDICINE
Payer: MEDICARE

## 2018-01-30 ENCOUNTER — HOSPITAL ENCOUNTER (OUTPATIENT)
Dept: RADIOLOGY | Facility: MEDICAL CENTER | Age: 73
End: 2018-01-30
Attending: NURSE PRACTITIONER
Payer: MEDICARE

## 2018-01-30 DIAGNOSIS — C85.80 OTHER SPECIFIED TYPES OF NON-HODGKIN LYMPHOMA, UNSPECIFIED SITE (HCC): ICD-10-CM

## 2018-01-30 PROCEDURE — 71046 X-RAY EXAM CHEST 2 VIEWS: CPT

## 2018-12-12 ENCOUNTER — HOSPITAL ENCOUNTER (OUTPATIENT)
Dept: RADIOLOGY | Facility: MEDICAL CENTER | Age: 73
End: 2018-12-12
Attending: FAMILY MEDICINE
Payer: MEDICARE

## 2018-12-12 DIAGNOSIS — Z12.31 SCREENING MAMMOGRAM, ENCOUNTER FOR: ICD-10-CM

## 2018-12-12 PROCEDURE — 77067 SCR MAMMO BI INCL CAD: CPT

## 2019-01-25 ENCOUNTER — HOSPITAL ENCOUNTER (EMERGENCY)
Facility: MEDICAL CENTER | Age: 74
End: 2019-01-25
Attending: EMERGENCY MEDICINE
Payer: MEDICARE

## 2019-01-25 VITALS
HEIGHT: 65 IN | TEMPERATURE: 97.9 F | WEIGHT: 127.43 LBS | SYSTOLIC BLOOD PRESSURE: 148 MMHG | DIASTOLIC BLOOD PRESSURE: 77 MMHG | OXYGEN SATURATION: 96 % | RESPIRATION RATE: 20 BRPM | HEART RATE: 106 BPM | BODY MASS INDEX: 21.23 KG/M2

## 2019-01-25 DIAGNOSIS — B02.9 HERPES ZOSTER WITHOUT COMPLICATION: ICD-10-CM

## 2019-01-25 LAB
ALBUMIN SERPL BCP-MCNC: 4.4 G/DL (ref 3.2–4.9)
ALBUMIN/GLOB SERPL: 2.1 G/DL
ALP SERPL-CCNC: 49 U/L (ref 30–99)
ALT SERPL-CCNC: 21 U/L (ref 2–50)
ANION GAP SERPL CALC-SCNC: 7 MMOL/L (ref 0–11.9)
AST SERPL-CCNC: 19 U/L (ref 12–45)
BASOPHILS # BLD AUTO: 0.8 % (ref 0–1.8)
BASOPHILS # BLD: 0.03 K/UL (ref 0–0.12)
BILIRUB SERPL-MCNC: 0.6 MG/DL (ref 0.1–1.5)
BUN SERPL-MCNC: 14 MG/DL (ref 8–22)
CALCIUM SERPL-MCNC: 9.7 MG/DL (ref 8.5–10.5)
CHLORIDE SERPL-SCNC: 103 MMOL/L (ref 96–112)
CO2 SERPL-SCNC: 27 MMOL/L (ref 20–33)
CREAT SERPL-MCNC: 0.66 MG/DL (ref 0.5–1.4)
EOSINOPHIL # BLD AUTO: 0.15 K/UL (ref 0–0.51)
EOSINOPHIL NFR BLD: 4.1 % (ref 0–6.9)
ERYTHROCYTE [DISTWIDTH] IN BLOOD BY AUTOMATED COUNT: 53.9 FL (ref 35.9–50)
GLOBULIN SER CALC-MCNC: 2.1 G/DL (ref 1.9–3.5)
GLUCOSE SERPL-MCNC: 93 MG/DL (ref 65–99)
HCT VFR BLD AUTO: 39.4 % (ref 37–47)
HGB BLD-MCNC: 13.4 G/DL (ref 12–16)
HGB RETIC QN AUTO: 37 PG/CELL (ref 29–35)
IMM GRANULOCYTES # BLD AUTO: 0.06 K/UL (ref 0–0.11)
IMM GRANULOCYTES NFR BLD AUTO: 1.6 % (ref 0–0.9)
IMM RETICS NFR: 11.7 % (ref 9.3–17.4)
LDH SERPL L TO P-CCNC: 184 U/L (ref 107–266)
LYMPHOCYTES # BLD AUTO: 0.31 K/UL (ref 1–4.8)
LYMPHOCYTES NFR BLD: 8.4 % (ref 22–41)
MCH RBC QN AUTO: 34.4 PG (ref 27–33)
MCHC RBC AUTO-ENTMCNC: 34 G/DL (ref 33.6–35)
MCV RBC AUTO: 101 FL (ref 81.4–97.8)
MONOCYTES # BLD AUTO: 0.61 K/UL (ref 0–0.85)
MONOCYTES NFR BLD AUTO: 16.6 % (ref 0–13.4)
NEUTROPHILS # BLD AUTO: 2.52 K/UL (ref 2–7.15)
NEUTROPHILS NFR BLD: 68.5 % (ref 44–72)
NRBC # BLD AUTO: 0 K/UL
NRBC BLD-RTO: 0 /100 WBC
PLATELET # BLD AUTO: 108 K/UL (ref 164–446)
PMV BLD AUTO: 10.7 FL (ref 9–12.9)
POTASSIUM SERPL-SCNC: 4.4 MMOL/L (ref 3.6–5.5)
PROT SERPL-MCNC: 6.5 G/DL (ref 6–8.2)
RBC # BLD AUTO: 3.9 M/UL (ref 4.2–5.4)
RETICS # AUTO: 0.11 M/UL (ref 0.04–0.06)
RETICS/RBC NFR: 2.9 % (ref 0.8–2.1)
SODIUM SERPL-SCNC: 137 MMOL/L (ref 135–145)
WBC # BLD AUTO: 3.7 K/UL (ref 4.8–10.8)

## 2019-01-25 PROCEDURE — A9270 NON-COVERED ITEM OR SERVICE: HCPCS | Performed by: EMERGENCY MEDICINE

## 2019-01-25 PROCEDURE — 83010 ASSAY OF HAPTOGLOBIN QUANT: CPT

## 2019-01-25 PROCEDURE — 700102 HCHG RX REV CODE 250 W/ 637 OVERRIDE(OP): Performed by: EMERGENCY MEDICINE

## 2019-01-25 PROCEDURE — 85025 COMPLETE CBC W/AUTO DIFF WBC: CPT

## 2019-01-25 PROCEDURE — 83615 LACTATE (LD) (LDH) ENZYME: CPT

## 2019-01-25 PROCEDURE — 85046 RETICYTE/HGB CONCENTRATE: CPT

## 2019-01-25 PROCEDURE — 80053 COMPREHEN METABOLIC PANEL: CPT

## 2019-01-25 PROCEDURE — 99284 EMERGENCY DEPT VISIT MOD MDM: CPT

## 2019-01-25 RX ORDER — HYDROCODONE BITARTRATE AND ACETAMINOPHEN 5; 325 MG/1; MG/1
0.5 TABLET ORAL ONCE
Status: COMPLETED | OUTPATIENT
Start: 2019-01-25 | End: 2019-01-25

## 2019-01-25 RX ORDER — HYDROCODONE BITARTRATE AND ACETAMINOPHEN 5; 325 MG/1; MG/1
1-2 TABLET ORAL EVERY 6 HOURS PRN
Qty: 20 TAB | Refills: 0 | Status: SHIPPED | OUTPATIENT
Start: 2019-01-25 | End: 2019-01-30

## 2019-01-25 RX ORDER — VALACYCLOVIR HYDROCHLORIDE 1 G/1
1000 TABLET, FILM COATED ORAL 3 TIMES DAILY
Qty: 21 TAB | Refills: 0 | Status: SHIPPED | OUTPATIENT
Start: 2019-01-25 | End: 2019-02-01

## 2019-01-25 RX ADMIN — HYDROCODONE BITARTRATE AND ACETAMINOPHEN 0.5 TABLET: 5; 325 TABLET ORAL at 09:12

## 2019-01-25 ASSESSMENT — PAIN DESCRIPTION - DESCRIPTORS: DESCRIPTORS: BURNING;TINGLING;SHARP

## 2019-01-25 NOTE — ED PROVIDER NOTES
"ED Provider Note    CHIEF COMPLAINT   Chief Complaint   Patient presents with   • Leg Pain     left leg pain x 4 days    • Rash     \"shingles\"       HPI   Puja Briscoe is a 73 y.o. female who presents with left leg pain for 4 days, rash on the left leg and low back over the past 24 hours.  She denies history of shingles.  No fever.  Patient is being treated for leukemia she states, also requesting lab draw for several test requested by her oncologist.  Pain in the left leg extends from the left low back through the buttock down the leg, described as burning and sharp.  No fever.  No abdominal pain.  Symptoms gradual onset.    REVIEW OF SYSTEMS   Musculoskeletal: Left leg pain  Neurologic: Denies numbness, no headache  Constitutional: No fever  Skin: Rash left lower back, left leg      PAST MEDICAL HISTORY   Past Medical History:   Diagnosis Date   • Hx of biopsy 1997    right breast       FAMILY HISTORY  No family history on file.    SOCIAL HISTORY  Social History     Social History   • Marital status: Single     Spouse name: N/A   • Number of children: N/A   • Years of education: N/A     Social History Main Topics   • Smoking status: Never Smoker   • Smokeless tobacco: Never Used   • Alcohol use Yes      Comment: Socially, last drink x1 month ago   • Drug use: No   • Sexual activity: Not on file     Other Topics Concern   • Not on file     Social History Narrative   • No narrative on file       SURGICAL HISTORY  Past Surgical History:   Procedure Laterality Date   • BONE MARROW BIOPSY, NDL/TROCAR  12/22/2017    Procedure: BONE MARROW BIOPSY, NDL/TROCAR;  Surgeon: Edilberto Monaco M.D.;  Location: [facility];  Service: Orthopedics   • BONE MARROW ASPIRATION  12/22/2017    Procedure: BONE MARROW ASPIRATION;  Surgeon: Edilberto Monaco M.D.;  Location: [facility];  Service: Orthopedics   • HYSTERECTOMY LAPAROSCOPY     • KNEE ORIF      left   • US-NEEDLE CORE BX-BREAST PANEL   " "      CURRENT MEDICATIONS   Home Medications     Reviewed by Abida Peterson R.N. (Registered Nurse) on 01/25/19 at 0725  Med List Status: Partial   Medication Last Dose Status   ascorbic acid (ASCORBIC ACID) 500 MG Tab 1/25/2019 Active   Calcium Carbonate-Vit D-Min (CALCIUM 1200 PO) 1/25/2019 Active   multivitamin (THERAGRAN) Tab 1/25/2019 Active                ALLERGIES   No Known Allergies    PHYSICAL EXAM  VITAL SIGNS: /77   Pulse (!) 106   Temp 36.6 °C (97.9 °F) (Temporal)   Resp 20   Ht 1.651 m (5' 5\")   Wt 57.8 kg (127 lb 6.8 oz)   SpO2 96%   BMI 21.20 kg/m²   Skin: Vesicular rash erythematous noted to the left lumbar region, superior left buttock, left leg approximate L5 distribution.   Vascular: Intact distal capillary refill.  Normal pulse left foot  Musculoskeletal: No midline spine tenderness, no deformity or evidence of trauma to the left leg  Neurologic: Sensation intact left foot      COURSE & MEDICAL DECISION MAKING  Pertinent Labs & Imaging studies reviewed. (See chart for details)  Patient initially with pre-herpetic neuralgia left leg, now showing pathopneumonic rash.  Patient is placed on Valtrex 1 g 3 times daily.  She is prescribed hydrocodone for pain to be used if needed.  Patient stated Tylenol Motrin had not worked for her discomfort.  Patient is also advised to use over-the-counter lidocaine patch as needed.  Patient is advised to follow-up with her primary doctor for recheck in 1 week, to return for any concern of worsening skin infection.    FINAL IMPRESSION     1. Herpes zoster without complication        In prescribing controlled substances to this patient, I certify that I have obtained and reviewed the medical history of Puja Briscoe. I have also made a good iva effort to obtain applicable records from other providers who have treated the patient and records did not demonstrate any increased risk of substance abuse that would prevent me from prescribing " controlled substances.     I have conducted a physical exam and documented it. I have reviewed Ms. Briscoe’s prescription history as maintained by the [place] Prescription Monitoring Program.     I have assessed the patient’s risk for abuse, dependency, and addiction using the validated Opioid Risk Tool available at https://www.mdcalc.com/nqmtuo-gaoa-iubs-ort-narcotic-abuse.     Given the above, I believe the benefits of controlled substance therapy outweigh the risks. The reasons for prescribing controlled substances include non-narcotic, oral analgesic alternatives have been inadequate for pain control. Accordingly, I have discussed the risk and benefits, treatment plan, and alternative therapies with the patient.           Electronically signed by: Cyrus Hatch, 1/25/2019 8:46 AM

## 2019-01-25 NOTE — ED TRIAGE NOTES
".  Chief Complaint   Patient presents with   • Leg Pain     left leg pain x 4 days    • Rash     \"shingles\"   ambulated to triage with c/o  Left hip and leg pain x 4 days. Pt reports onset today of rash \"i think its shingles\" to left inner leg. Pain 7/10 sharp burning pain.   "

## 2019-01-25 NOTE — ED NOTES
Per MD Hatch, pt does not need to wait for blood work results prior to DC. Pt aware of when to follow up with PMD and when to return to the ED if needed. Pt ambulatory with steady gait and all belongings in NAD. Pt states she will not be driving home from the ED today s/p taking [place].

## 2019-01-25 NOTE — ED NOTES
"Pt presents to ED c/o left hip pain with radiation down LLE x4 days, denies trauma/injury. Pt states pain has been \"constant and sharp.\" Pt also c/o rash left inner thigh this morning, states \"I think it's shingles.\" Pt also c/o slight nausea, states \"that's nothing unusual.\" Pt denies HA/dizziness/LH/F/C/SOB/CP. Pt denies abd pain/diarrhea/constipation/urinary complaints. Pt also denies any N/T or unilateral weakness. Skin W/D. AOX3, speech clear, RR even & unlabored. PMHx including but not limited to Leukemia.  "

## 2019-01-26 LAB — HAPTOGLOB SERPL-MCNC: 45 MG/DL (ref 30–200)

## 2019-05-11 ENCOUNTER — HOSPITAL ENCOUNTER (OUTPATIENT)
Facility: MEDICAL CENTER | Age: 74
End: 2019-05-11
Attending: NURSE PRACTITIONER
Payer: MEDICARE

## 2019-05-11 ENCOUNTER — OFFICE VISIT (OUTPATIENT)
Dept: URGENT CARE | Facility: CLINIC | Age: 74
End: 2019-05-11
Payer: MEDICARE

## 2019-05-11 VITALS
RESPIRATION RATE: 16 BRPM | OXYGEN SATURATION: 94 % | HEART RATE: 93 BPM | TEMPERATURE: 98.8 F | SYSTOLIC BLOOD PRESSURE: 122 MMHG | DIASTOLIC BLOOD PRESSURE: 72 MMHG | HEIGHT: 65 IN | WEIGHT: 122 LBS | BODY MASS INDEX: 20.33 KG/M2

## 2019-05-11 DIAGNOSIS — N30.90 CYSTITIS: ICD-10-CM

## 2019-05-11 DIAGNOSIS — R30.0 DYSURIA: ICD-10-CM

## 2019-05-11 LAB
APPEARANCE UR: CLEAR
BILIRUB UR STRIP-MCNC: NORMAL MG/DL
COLOR UR AUTO: YELLOW
GLUCOSE UR STRIP.AUTO-MCNC: NORMAL MG/DL
KETONES UR STRIP.AUTO-MCNC: NORMAL MG/DL
LEUKOCYTE ESTERASE UR QL STRIP.AUTO: NORMAL
NITRITE UR QL STRIP.AUTO: NORMAL
PH UR STRIP.AUTO: 6 [PH] (ref 5–8)
PROT UR QL STRIP: NORMAL MG/DL
RBC UR QL AUTO: NORMAL
SP GR UR STRIP.AUTO: 1.01
UROBILINOGEN UR STRIP-MCNC: 0.2 MG/DL

## 2019-05-11 PROCEDURE — 87086 URINE CULTURE/COLONY COUNT: CPT

## 2019-05-11 PROCEDURE — 99203 OFFICE O/P NEW LOW 30 MIN: CPT | Performed by: NURSE PRACTITIONER

## 2019-05-11 PROCEDURE — 87186 SC STD MICRODIL/AGAR DIL: CPT

## 2019-05-11 PROCEDURE — 87077 CULTURE AEROBIC IDENTIFY: CPT

## 2019-05-11 PROCEDURE — 81002 URINALYSIS NONAUTO W/O SCOPE: CPT | Performed by: NURSE PRACTITIONER

## 2019-05-11 RX ORDER — CEFDINIR 300 MG/1
300 CAPSULE ORAL 2 TIMES DAILY
Qty: 10 CAP | Refills: 0 | Status: SHIPPED | OUTPATIENT
Start: 2019-05-11 | End: 2019-05-16

## 2019-05-11 ASSESSMENT — ENCOUNTER SYMPTOMS
FEVER: 0
CHILLS: 0
FLANK PAIN: 0
EMPTYING BLADDER: 1

## 2019-05-11 NOTE — PROGRESS NOTES
Subjective:      Puja Briscoe is a 74 y.o. female who presents with Cystitis            Cystitis          ROS       Objective:     There were no vitals taken for this visit.     Physical Exam            Assessment/Plan:     There are no diagnoses linked to this encounter.

## 2019-05-11 NOTE — PROGRESS NOTES
"Subjective:      Puja Briscoe is a 74 y.o. female who presents with Cystitis (burning when urinating,cloudy urine,x2 days )    Past Medical History:   Diagnosis Date   • Hx of biopsy 1997    right breast     Social History     Social History   • Marital status: Single     Spouse name: N/A   • Number of children: N/A   • Years of education: N/A     Occupational History   • Not on file.     Social History Main Topics   • Smoking status: Never Smoker   • Smokeless tobacco: Never Used   • Alcohol use Yes      Comment: Socially, last drink x1 month ago   • Drug use: No   • Sexual activity: Not on file     Other Topics Concern   • Not on file     Social History Narrative   • No narrative on file     History reviewed. No pertinent family history.    Allergies: Patient has no known allergies.      Patient is a 74-year-old female who presents today with complaint of dysuria, urgency, and frequency.  Symptoms started over the last 2 days.  She denies fever, aches, or chills.  No lower back pain.        Cystitis    This is a new problem. The current episode started in the past 7 days. The problem occurs every urination. The problem has been unchanged. The quality of the pain is described as aching and burning. The pain is moderate. There has been no fever. Associated symptoms include frequency and urgency. Pertinent negatives include no chills, flank pain or hematuria. She has tried nothing for the symptoms. The treatment provided no relief.       Review of Systems   Constitutional: Positive for malaise/fatigue. Negative for chills and fever.   Genitourinary: Positive for dysuria, frequency and urgency. Negative for flank pain and hematuria.   Skin: Negative.    All other systems reviewed and are negative.         Objective:     /72 (BP Location: Right arm, Patient Position: Sitting)   Pulse 93   Temp 37.1 °C (98.8 °F) (Temporal)   Resp 16   Ht 1.651 m (5' 5\")   Wt 55.3 kg (122 lb)   SpO2 94%   BMI 20.30 " kg/m²      Physical Exam   Constitutional: She appears well-developed and well-nourished. No distress.   Cardiovascular: Normal rate and regular rhythm.    Pulmonary/Chest: Effort normal and breath sounds normal.   Abdominal: There is tenderness.   Positive suprapubic tenderness  No CVA tenderness    Musculoskeletal: Normal range of motion.   Skin: She is not diaphoretic.   Vitals reviewed.    UA: positive leukocytes, negative nitrates, positive blood          Assessment/Plan:     1. Dysuria    - POCT Urinalysis  - URINE CULTURE(NEW); Future  -omnicef  -push fluids  -ER precautions: fevre >100.5, n/v, flank pain, general malaise, flu-like symptoms.

## 2019-05-12 DIAGNOSIS — R30.0 DYSURIA: ICD-10-CM

## 2019-05-14 LAB
BACTERIA UR CULT: ABNORMAL
BACTERIA UR CULT: ABNORMAL
SIGNIFICANT IND 70042: ABNORMAL
SITE SITE: ABNORMAL
SOURCE SOURCE: ABNORMAL

## 2019-09-24 NOTE — PROGRESS NOTES
Oncology/Hematology Progress Note               Author: Clark Martinez Date & Time created: 12/22/2017  6:01 PM   DX-LPD  AIHA  Interval History:  12/22/17-BM Bx well tolerated. Feels well. No bleeding.    Review of Systems:  Review of Systems   Constitutional: Negative for fever.   Respiratory: Negative for cough, hemoptysis and sputum production.    Cardiovascular: Negative for chest pain, palpitations and leg swelling.   Musculoskeletal: Negative for back pain and myalgias.   Skin: Negative for itching and rash.   Neurological: Positive for weakness.       Physical Exam:  Physical Exam   Constitutional: She is oriented to person, place, and time. She appears well-developed. No distress.   Cardiovascular: Normal rate.  Exam reveals no gallop.    No murmur heard.  Pulmonary/Chest: Effort normal and breath sounds normal. No respiratory distress. She has no wheezes. She has no rales.   Neurological: She is alert and oriented to person, place, and time. No cranial nerve deficit.   Skin: Skin is warm and dry. She is not diaphoretic. No erythema.       Labs:        Invalid input(s): [id number]  Recent Labs      12/20/17 1722   TROPONINI  <0.01   BNPBTYPENAT  87     Recent Labs      12/20/17   1722  12/21/17   0316  12/22/17   0353   SODIUM  138  139  141   POTASSIUM  4.1  3.7  3.7   CHLORIDE  105  108  115*   CO2  23  24  19*   BUN  12  11  8   CREATININE  0.70  0.62  0.48*   CALCIUM  9.3  8.8  8.1*     Recent Labs      12/20/17   1722  12/21/17   0316  12/22/17   0353   ALTSGPT  13  10  8   ASTSGOT  20  16  15   ALKPHOSPHAT  80  62  54   TBILIRUBIN  1.1  1.8*  1.2   LIPASE  22   --    --    GLUCOSE  101*  94  88     Recent Labs      12/20/17   1722  12/21/17   0803  12/21/17   1359  12/22/17   0353   RBC  1.74*  1.78*  2.42*  2.03*   HEMOGLOBIN  6.3*  6.2*  8.4*  7.1*   HEMATOCRIT  21.1*  20.4*  26.3*  22.5*   PLATELETCT  22*  17*  18*  16*   PROTHROMBTM  13.5   --    --    --    APTT  22.6*   --    --    --    INR  What Type Of Note Output Would You Prefer (Optional)?: Standard Output  1.06   --    --    --      Recent Labs      17   1722  17   0316  17   0803  17   1359  17   0353   WBC  45.5*   --   30.2*  31.8*  32.1*   NEUTSPOLYS  13.00*   --   12.20*  15.20*  14.90*   LYMPHOCYTES  73.00*   --   78.20*  81.20*  75.40*   MONOCYTES  3.50   --   3.50  1.80  3.50   EOSINOPHILS  0.00   --   0.00  0.90  1.70   BASOPHILS  0.00   --   0.00  0.00  0.00   ASTSGOT  20  16   --    --   15   ALTSGPT  13  10   --    --   8   ALKPHOSPHAT  80  62   --    --   54   TBILIRUBIN  1.1  1.8*   --    --   1.2     Recent Labs      17   1722  17   0316  17   0353   SODIUM  138  139  141   POTASSIUM  4.1  3.7  3.7   CHLORIDE  105  108  115*   CO2  23  24  19*   GLUCOSE  101*  94  88   BUN  12  11  8   CREATININE  0.70  0.62  0.48*   CALCIUM  9.3  8.8  8.1*     Hemodynamics:  Temp (24hrs), Av °C (98.6 °F), Min:36.1 °C (97 °F), Max:37.5 °C (99.5 °F)  Temperature: 36.7 °C (98.1 °F)  Pulse  Av.2  Min: 79  Max: 128Heart Rate (Monitored): 97  Blood Pressure : 146/77, NIBP: 142/47     Respiratory:    Respiration: 20, Pulse Oximetry: 99 %     Work Of Breathing / Effort: Mild  RUL Breath Sounds: Clear, RML Breath Sounds: Clear, RLL Breath Sounds: Diminished, RICHARD Breath Sounds: Clear, LLL Breath Sounds: Diminished  Fluids:    Intake/Output Summary (Last 24 hours) at 17 1801  Last data filed at 17 0600   Gross per 24 hour   Intake             1740 ml   Output              900 ml   Net              840 ml     Weight: 56.5 kg (124 lb 9 oz)  GI/Nutrition:  Orders Placed This Encounter   Procedures   • DIET ORDER     Standing Status:   Standing     Number of Occurrences:   1     Order Specific Question:   Diet:     Answer:   Regular [1]     Medical Decision Making, by Problem:  Active Hospital Problems    Diagnosis   • *Leukocytosis [D72.829]   • Anemia [D64.9]   • Thrombocytopenia (CMS-HCC) [D69.6]       Plan:  LPD-Likely LPD?CLL.Await BM Bx well.  AIHA-Will  Hpi Title: Evaluation of Skin Lesions How Severe Are Your Spot(S)?: moderate start on steroids. Started on PDS.D/W Dr Arizmendi.    Quality-Core Measures   Have Your Spot(S) Been Treated In The Past?: has not been treated

## 2019-12-16 ENCOUNTER — HOSPITAL ENCOUNTER (OUTPATIENT)
Dept: RADIOLOGY | Facility: MEDICAL CENTER | Age: 74
End: 2019-12-16
Attending: INTERNAL MEDICINE
Payer: MEDICARE

## 2019-12-16 DIAGNOSIS — Z12.31 VISIT FOR SCREENING MAMMOGRAM: ICD-10-CM

## 2019-12-16 PROCEDURE — 77063 BREAST TOMOSYNTHESIS BI: CPT

## 2021-01-13 ENCOUNTER — HOSPITAL ENCOUNTER (OUTPATIENT)
Dept: RADIOLOGY | Facility: MEDICAL CENTER | Age: 76
End: 2021-01-13
Attending: INTERNAL MEDICINE
Payer: MEDICARE

## 2021-01-13 DIAGNOSIS — Z12.31 VISIT FOR SCREENING MAMMOGRAM: ICD-10-CM

## 2021-01-13 PROCEDURE — 77063 BREAST TOMOSYNTHESIS BI: CPT

## 2021-01-15 DIAGNOSIS — Z23 NEED FOR VACCINATION: ICD-10-CM

## 2021-02-24 ENCOUNTER — OFFICE VISIT (OUTPATIENT)
Dept: URGENT CARE | Facility: CLINIC | Age: 76
End: 2021-02-24
Payer: MEDICARE

## 2021-02-24 ENCOUNTER — APPOINTMENT (OUTPATIENT)
Dept: RADIOLOGY | Facility: IMAGING CENTER | Age: 76
End: 2021-02-24
Attending: FAMILY MEDICINE
Payer: MEDICARE

## 2021-02-24 VITALS
RESPIRATION RATE: 20 BRPM | SYSTOLIC BLOOD PRESSURE: 130 MMHG | TEMPERATURE: 98.3 F | HEART RATE: 138 BPM | WEIGHT: 126 LBS | OXYGEN SATURATION: 98 % | DIASTOLIC BLOOD PRESSURE: 88 MMHG | HEIGHT: 65 IN | BODY MASS INDEX: 20.99 KG/M2

## 2021-02-24 DIAGNOSIS — S82.892A CLOSED FRACTURE OF LEFT ANKLE, INITIAL ENCOUNTER: ICD-10-CM

## 2021-02-24 DIAGNOSIS — M79.672 LEFT FOOT PAIN: ICD-10-CM

## 2021-02-24 PROCEDURE — 73630 X-RAY EXAM OF FOOT: CPT | Mod: TC,FY,LT | Performed by: FAMILY MEDICINE

## 2021-02-24 PROCEDURE — 99215 OFFICE O/P EST HI 40 MIN: CPT | Performed by: FAMILY MEDICINE

## 2021-02-24 PROCEDURE — 73610 X-RAY EXAM OF ANKLE: CPT | Mod: TC,FY,LT | Performed by: FAMILY MEDICINE

## 2021-02-24 RX ORDER — HYDROCODONE BITARTRATE AND ACETAMINOPHEN 5; 325 MG/1; MG/1
1 TABLET ORAL EVERY 8 HOURS PRN
Qty: 5 TABLET | Refills: 0 | Status: SHIPPED | OUTPATIENT
Start: 2021-02-24 | End: 2021-03-03

## 2021-02-25 NOTE — PROGRESS NOTES
"Chief Complaint   Patient presents with   • Ankle Pain     left side, swelling, bruising  x 1 day     Subjective:               Ankle Injury   The incident occurred yesterday - GLF. The incident occurred  while walking. The injury mechanism was an inversion injury. The pain is present in the left ankle. The pain is severe.   + pain w/wtbearing.         The pain has been constant since onset. Pertinent negatives include no inability to bear weight, loss of motion, muscle weakness, numbness or tingling. The symptoms are aggravated by weight bearing and palpation. pt has tried acetaminophen for the symptoms. The treatment provided mild relief.       Social History     Tobacco Use   • Smoking status: Never Smoker   • Smokeless tobacco: Never Used   Substance Use Topics   • Alcohol use: Yes     Comment: Socially, last drink x1 month ago   • Drug use: No         Past Medical History:   Diagnosis Date   • Hx of biopsy 1997    right breast         Review of Systems   Constitutional: Negative for fever.   Respiratory: Negative for shortness of breath.    Cardiovascular: Negative for chest pain.   Neurological: Negative for tingling and numbness.   All other systems reviewed and are negative.         Objective:     /88 (BP Location: Left arm, Patient Position: Sitting, BP Cuff Size: Adult)   Pulse (!) 138   Temp 36.8 °C (98.3 °F) (Temporal)   Resp 20   Ht 1.651 m (5' 5\")   Wt 57.2 kg (126 lb)   SpO2 98%     Physical Exam   Constitutional: pt is oriented to person, place, and time. Pt appears well-developed. No distress.   HENT:   Head: Normocephalic and atraumatic.   Eyes: Conjunctivae are normal.   Cardiovascular: Normal rate and regular rhythm.    Pulmonary/Chest: Effort normal and breath sounds normal.   Musculoskeletal:        Left ankle: pt exhibits swelling and ecchymosis. Pt exhibits normal range of motion. Tenderness. Lateral malleolus tenderness found. Achilles tendon normal.        Left foot: There is " normal range of motion, normal capillary refill and no crepitus.   Neurological: pt is alert and oriented to person, place, and time. No cranial nerve deficit.   Skin: Skin is warm. Pt is not diaphoretic. No erythema.   Psychiatric: His behavior is normal.   Nursing note and vitals reviewed.       DX-FOOT-COMPLETE 3+ LEFT  Order: [id number]  Status:  Final result   Visible to patient:  No (scheduled for 2/26/2021  7:16 AM) Next appt:  None Dx:  Left foot pain  Details    Reading Physician Reading Date Result Priority   Tejas Zhu M.D.  [phone] 2/24/2021 Urgent [facility]      Narrative & Impression        2/24/2021 6:45 PM     HISTORY/REASON FOR EXAM:  Pain/Deformity Following Trauma        TECHNIQUE/EXAM DESCRIPTION AND NUMBER OF VIEWS:  3 views of the LEFT foot.     COMPARISON:     FINDINGS:  The alignment is grossly normal.  There is no evidence of displaced fracture or dislocation of the foot. There is evidence of a medial and lateral malleoli fractures.  There is diffuse soft tissue swelling.     The bones are osteopenic     IMPRESSION:     Bimalleolar ankle fracture.           Details    Reading Physician Reading Date Result Priority   Tejas Zhu M.D.  [phone] 2/24/2021 Urgent Care      Narrative & Impression        2/24/2021 6:45 PM     HISTORY/REASON FOR EXAM:  Pain/Deformity Following Trauma        TECHNIQUE/EXAM DESCRIPTION AND NUMBER OF VIEWS:  3 views of the LEFT ankle.     COMPARISON: Foot x-ray same day     FINDINGS:  There are minimal displaced fractures of the medial and lateral malleoli. There is diffuse soft tissue swelling. The ankle mortise appears intact.     IMPRESSION:   Bimalleolar ankle fracture            Assessment/Plan:      1. Closed fracture of left ankle, initial encounter    X-rays were personally reviewed by myself.   There is bimalleolar fracture as noted above.      Placed in post mold orthoglass splint    Crutches given - NWB on left  - REFERRAL TO ORTHOPEDICS  -  HYDROcodone-acetaminophen (NORCO) 5-325 MG Tab per tablet; Take 1 tablet by mouth every 8 hours as needed for up to 7 days.  Dispense: 5 tablet; Refill: 0         A total of 58 minutes were spent with the patient during this encounter taking history, physical, placing orders, chart review and imaging interpretation.

## 2022-03-03 ENCOUNTER — HOSPITAL ENCOUNTER (OUTPATIENT)
Dept: RADIOLOGY | Facility: MEDICAL CENTER | Age: 77
End: 2022-03-03
Attending: INTERNAL MEDICINE
Payer: MEDICARE

## 2022-03-03 DIAGNOSIS — Z12.31 VISIT FOR SCREENING MAMMOGRAM: ICD-10-CM

## 2022-03-03 PROCEDURE — 77063 BREAST TOMOSYNTHESIS BI: CPT

## 2022-08-25 ENCOUNTER — HOSPITAL ENCOUNTER (OUTPATIENT)
Facility: MEDICAL CENTER | Age: 77
End: 2022-08-25
Attending: STUDENT IN AN ORGANIZED HEALTH CARE EDUCATION/TRAINING PROGRAM
Payer: MEDICARE

## 2022-08-25 ENCOUNTER — OFFICE VISIT (OUTPATIENT)
Dept: URGENT CARE | Facility: CLINIC | Age: 77
End: 2022-08-25
Payer: MEDICARE

## 2022-08-25 VITALS
SYSTOLIC BLOOD PRESSURE: 120 MMHG | WEIGHT: 140.4 LBS | RESPIRATION RATE: 14 BRPM | BODY MASS INDEX: 23.39 KG/M2 | HEIGHT: 65 IN | TEMPERATURE: 98.6 F | OXYGEN SATURATION: 98 % | DIASTOLIC BLOOD PRESSURE: 78 MMHG | HEART RATE: 104 BPM

## 2022-08-25 DIAGNOSIS — N30.01 ACUTE CYSTITIS WITH HEMATURIA: ICD-10-CM

## 2022-08-25 LAB
APPEARANCE UR: NORMAL
BILIRUB UR STRIP-MCNC: NEGATIVE MG/DL
COLOR UR AUTO: YELLOW
GLUCOSE UR STRIP.AUTO-MCNC: NEGATIVE MG/DL
KETONES UR STRIP.AUTO-MCNC: NEGATIVE MG/DL
LEUKOCYTE ESTERASE UR QL STRIP.AUTO: NORMAL
NITRITE UR QL STRIP.AUTO: NEGATIVE
PH UR STRIP.AUTO: 6 [PH] (ref 5–8)
PROT UR QL STRIP: 30 MG/DL
RBC UR QL AUTO: NORMAL
SP GR UR STRIP.AUTO: 1.01
UROBILINOGEN UR STRIP-MCNC: 0.2 MG/DL

## 2022-08-25 PROCEDURE — 87077 CULTURE AEROBIC IDENTIFY: CPT

## 2022-08-25 PROCEDURE — 81002 URINALYSIS NONAUTO W/O SCOPE: CPT | Performed by: STUDENT IN AN ORGANIZED HEALTH CARE EDUCATION/TRAINING PROGRAM

## 2022-08-25 PROCEDURE — 87186 SC STD MICRODIL/AGAR DIL: CPT

## 2022-08-25 PROCEDURE — 87086 URINE CULTURE/COLONY COUNT: CPT

## 2022-08-25 PROCEDURE — 99213 OFFICE O/P EST LOW 20 MIN: CPT | Performed by: STUDENT IN AN ORGANIZED HEALTH CARE EDUCATION/TRAINING PROGRAM

## 2022-08-25 RX ORDER — SULFAMETHOXAZOLE AND TRIMETHOPRIM 800; 160 MG/1; MG/1
1 TABLET ORAL EVERY 12 HOURS
Qty: 6 TABLET | Refills: 0 | Status: SHIPPED | OUTPATIENT
Start: 2022-08-25 | End: 2022-08-28

## 2022-08-25 ASSESSMENT — ENCOUNTER SYMPTOMS
NAUSEA: 0
FEVER: 0
FLANK PAIN: 0
ABDOMINAL PAIN: 0
CONSTIPATION: 0
VOMITING: 0
CHILLS: 0
DIARRHEA: 0

## 2022-08-25 NOTE — PROGRESS NOTES
"Subjective     Puja Briscoe is a 77 y.o. female who presents with Bladder Problem (X2 days problem with urination and urges to urinate often)            Puja a pleasant 77-year-old female who presents today with UTI-like symptoms.  Patient states that she has had increased frequency and urgency with urination.  Patient has also noticed pressure buildup in her bladder and has had uncomfortable feeling while urinating.  Patient denies abdominal pain, flank pain, blood in urine, nausea/vomiting/diarrhea.  Has had UTI in the past and symptoms seem similar to previous UTI.      Review of Systems   Constitutional:  Negative for chills, fever and malaise/fatigue.   Gastrointestinal:  Negative for abdominal pain, constipation, diarrhea, nausea and vomiting.   Genitourinary:  Positive for frequency and urgency. Negative for dysuria, flank pain and hematuria.   All other systems reviewed and are negative.           Objective     /78 (BP Location: Left arm, Patient Position: Sitting, BP Cuff Size: Adult)   Pulse (!) 104   Temp 37 °C (98.6 °F) (Temporal)   Resp 14   Ht 1.651 m (5' 5\")   Wt 63.7 kg (140 lb 6.4 oz)   SpO2 98%   BMI 23.36 kg/m²      Physical Exam  Vitals reviewed.   Constitutional:       Appearance: Normal appearance.   HENT:      Head: Normocephalic and atraumatic.   Abdominal:      General: Abdomen is flat. Bowel sounds are normal.      Tenderness: There is no abdominal tenderness. There is no right CVA tenderness, left CVA tenderness, guarding or rebound.   Skin:     General: Skin is warm and dry.   Neurological:      General: No focal deficit present.      Mental Status: She is alert. Mental status is at baseline.                           Assessment & Plan        1. Acute cystitis with hematuria  - POCT Urinalysis  - URINE CULTURE(NEW); Future  - sulfamethoxazole-trimethoprim (BACTRIM DS) 800-160 MG tablet; Take 1 Tablet by mouth every 12 hours for 3 days.  Dispense: 6 Tablet; Refill: " 0    The patient's presenting symptoms and physical exam findings are consistent with an acute urinary tract infection.     Urine Culture: sent/pending and will notify patient if necessary change in antibiotic therapy.     Patient advised on following in clinic:  OTC Tylenol or Motrin for discomfort.  Drink plenty of fluids.  Finish antibiotics as prescribed.  Follow-up with PCP.    Return to clinic or go to the ED if symptoms worsen or fail to improve, or if the patient should develop worsening/increasing urinary symptoms, hematuria, flank pain, abdominal pain, nausea/vomiting, fever/chills, and/or any concerning symptoms.    Differential diagnoses, supportive care, and indications for immediate follow-up discussed with patient. Instructed to return to clinic or nearest emergency department for any change in condition, further concerns, or worsening of symptoms.    Discussed plan with the patient and patient agrees with plan of care and discharge instructions.    8/29/22: Urine culture resulted positive for E. coli which is resistant to Bactrim.  Patient contacted via telephone and started on Cipro for twice a day for 5 days.   - Ciprofloxacin (CIPRO) 500 MG Tab; Take 1 Tablet by mouth every 12 hours for 5 days.  Dispense: 10 Tablet; Refill: 0                  83 y/o F presenting with coughs, sore throat, and shortness of breath after testing [+] for COVID-19. Pt was recently started on Paxlovid. Plan for EKG, CXR, routine labs, and symptomatic Tx. Will reassess clinically after results have been obtained.

## 2022-08-26 LAB
AMBIGUOUS DTTM AMBI4: NORMAL
SIGNIFICANT IND 70042: NORMAL
SITE SITE: NORMAL
SOURCE SOURCE: NORMAL

## 2022-08-29 ENCOUNTER — TELEPHONE (OUTPATIENT)
Dept: URGENT CARE | Facility: PHYSICIAN GROUP | Age: 77
End: 2022-08-29
Payer: MEDICARE

## 2022-08-29 RX ORDER — CIPROFLOXACIN 500 MG/1
500 TABLET, FILM COATED ORAL EVERY 12 HOURS
Qty: 10 TABLET | Refills: 0 | Status: SHIPPED | OUTPATIENT
Start: 2022-08-29 | End: 2022-09-03

## 2022-08-30 NOTE — TELEPHONE ENCOUNTER
Urine culture resulted positive for E. coli which is resistant to Bactrim. Patient contacted via telephone and started on Cipro for twice a day for 5 days.  Patient to discontinue Bactrim.   Sent to pharmacy: Ciprofloxacin (CIPRO) 500 MG Tab; Take 1 Tablet by mouth every 12 hours for 5 days.  Dispense: 10 Tablet; Refill: 0

## 2022-11-03 ENCOUNTER — HOSPITAL ENCOUNTER (OUTPATIENT)
Dept: LAB | Facility: MEDICAL CENTER | Age: 77
End: 2022-11-03
Attending: INTERNAL MEDICINE
Payer: MEDICARE

## 2022-11-03 LAB
ALBUMIN SERPL BCP-MCNC: 4.7 G/DL (ref 3.2–4.9)
ALBUMIN/GLOB SERPL: 2 G/DL
ALP SERPL-CCNC: 67 U/L (ref 30–99)
ALT SERPL-CCNC: 25 U/L (ref 2–50)
ANION GAP SERPL CALC-SCNC: 10 MMOL/L (ref 7–16)
AST SERPL-CCNC: 21 U/L (ref 12–45)
BASOPHILS # BLD AUTO: 1 % (ref 0–1.8)
BASOPHILS # BLD: 0.06 K/UL (ref 0–0.12)
BILIRUB SERPL-MCNC: 0.4 MG/DL (ref 0.1–1.5)
BUN SERPL-MCNC: 15 MG/DL (ref 8–22)
CALCIUM SERPL-MCNC: 10 MG/DL (ref 8.5–10.5)
CHLORIDE SERPL-SCNC: 100 MMOL/L (ref 96–112)
CO2 SERPL-SCNC: 26 MMOL/L (ref 20–33)
CREAT SERPL-MCNC: 0.61 MG/DL (ref 0.5–1.4)
EOSINOPHIL # BLD AUTO: 0.24 K/UL (ref 0–0.51)
EOSINOPHIL NFR BLD: 3.9 % (ref 0–6.9)
ERYTHROCYTE [DISTWIDTH] IN BLOOD BY AUTOMATED COUNT: 60.3 FL (ref 35.9–50)
GFR SERPLBLD CREATININE-BSD FMLA CKD-EPI: 91 ML/MIN/1.73 M 2
GLOBULIN SER CALC-MCNC: 2.4 G/DL (ref 1.9–3.5)
GLUCOSE SERPL-MCNC: 94 MG/DL (ref 65–99)
HCT VFR BLD AUTO: 43.8 % (ref 37–47)
HGB BLD-MCNC: 14.3 G/DL (ref 12–16)
HGB RETIC QN AUTO: 40.5 PG/CELL (ref 29–35)
IMM GRANULOCYTES # BLD AUTO: 0.07 K/UL (ref 0–0.11)
IMM GRANULOCYTES NFR BLD AUTO: 1.1 % (ref 0–0.9)
IMM RETICS NFR: 24.3 % (ref 9.3–17.4)
LDH SERPL L TO P-CCNC: 237 U/L (ref 107–266)
LYMPHOCYTES # BLD AUTO: 1.35 K/UL (ref 1–4.8)
LYMPHOCYTES NFR BLD: 22.2 % (ref 22–41)
MCH RBC QN AUTO: 35.6 PG (ref 27–33)
MCHC RBC AUTO-ENTMCNC: 32.6 G/DL (ref 33.6–35)
MCV RBC AUTO: 109 FL (ref 81.4–97.8)
MONOCYTES # BLD AUTO: 1.26 K/UL (ref 0–0.85)
MONOCYTES NFR BLD AUTO: 20.7 % (ref 0–13.4)
NEUTROPHILS # BLD AUTO: 3.11 K/UL (ref 2–7.15)
NEUTROPHILS NFR BLD: 51.1 % (ref 44–72)
NRBC # BLD AUTO: 0 K/UL
NRBC BLD-RTO: 0 /100 WBC
PLATELET # BLD AUTO: 187 K/UL (ref 164–446)
PMV BLD AUTO: 10.7 FL (ref 9–12.9)
POTASSIUM SERPL-SCNC: 4.6 MMOL/L (ref 3.6–5.5)
PROT SERPL-MCNC: 7.1 G/DL (ref 6–8.2)
RBC # BLD AUTO: 4.02 M/UL (ref 4.2–5.4)
RETICS # AUTO: 0.11 M/UL (ref 0.04–0.06)
RETICS/RBC NFR: 2.7 % (ref 0.8–2.1)
SODIUM SERPL-SCNC: 136 MMOL/L (ref 135–145)
WBC # BLD AUTO: 6.1 K/UL (ref 4.8–10.8)

## 2022-11-03 PROCEDURE — 85046 RETICYTE/HGB CONCENTRATE: CPT

## 2022-11-03 PROCEDURE — 85025 COMPLETE CBC W/AUTO DIFF WBC: CPT

## 2022-11-03 PROCEDURE — 80053 COMPREHEN METABOLIC PANEL: CPT

## 2022-11-03 PROCEDURE — 36415 COLL VENOUS BLD VENIPUNCTURE: CPT

## 2022-11-03 PROCEDURE — 83615 LACTATE (LD) (LDH) ENZYME: CPT

## 2022-11-11 ENCOUNTER — PATIENT MESSAGE (OUTPATIENT)
Dept: HEALTH INFORMATION MANAGEMENT | Facility: OTHER | Age: 77
End: 2022-11-11

## 2023-04-20 ENCOUNTER — HOSPITAL ENCOUNTER (OUTPATIENT)
Dept: RADIOLOGY | Facility: MEDICAL CENTER | Age: 78
End: 2023-04-20
Attending: INTERNAL MEDICINE
Payer: MEDICARE

## 2023-04-20 DIAGNOSIS — Z12.31 VISIT FOR SCREENING MAMMOGRAM: ICD-10-CM

## 2023-04-20 PROCEDURE — 77063 BREAST TOMOSYNTHESIS BI: CPT

## 2023-05-02 ENCOUNTER — HOSPITAL ENCOUNTER (OUTPATIENT)
Dept: LAB | Facility: MEDICAL CENTER | Age: 78
End: 2023-05-02
Attending: INTERNAL MEDICINE
Payer: MEDICARE

## 2023-05-02 LAB
ALBUMIN SERPL BCP-MCNC: 4.5 G/DL (ref 3.2–4.9)
ALBUMIN/GLOB SERPL: 2.3 G/DL
ALP SERPL-CCNC: 69 U/L (ref 30–99)
ALT SERPL-CCNC: 23 U/L (ref 2–50)
ANION GAP SERPL CALC-SCNC: 13 MMOL/L (ref 7–16)
AST SERPL-CCNC: 15 U/L (ref 12–45)
BASOPHILS # BLD AUTO: 1.3 % (ref 0–1.8)
BASOPHILS # BLD: 0.08 K/UL (ref 0–0.12)
BILIRUB SERPL-MCNC: 0.3 MG/DL (ref 0.1–1.5)
BUN SERPL-MCNC: 13 MG/DL (ref 8–22)
CALCIUM ALBUM COR SERPL-MCNC: 9.2 MG/DL (ref 8.5–10.5)
CALCIUM SERPL-MCNC: 9.6 MG/DL (ref 8.5–10.5)
CHLORIDE SERPL-SCNC: 97 MMOL/L (ref 96–112)
CO2 SERPL-SCNC: 25 MMOL/L (ref 20–33)
CREAT SERPL-MCNC: 0.63 MG/DL (ref 0.5–1.4)
EOSINOPHIL # BLD AUTO: 0.28 K/UL (ref 0–0.51)
EOSINOPHIL NFR BLD: 4.6 % (ref 0–6.9)
ERYTHROCYTE [DISTWIDTH] IN BLOOD BY AUTOMATED COUNT: 55.1 FL (ref 35.9–50)
GFR SERPLBLD CREATININE-BSD FMLA CKD-EPI: 90 ML/MIN/1.73 M 2
GLOBULIN SER CALC-MCNC: 2 G/DL (ref 1.9–3.5)
GLUCOSE SERPL-MCNC: 95 MG/DL (ref 65–99)
HCT VFR BLD AUTO: 41.7 % (ref 37–47)
HGB BLD-MCNC: 14 G/DL (ref 12–16)
IMM GRANULOCYTES # BLD AUTO: 0.07 K/UL (ref 0–0.11)
IMM GRANULOCYTES NFR BLD AUTO: 1.1 % (ref 0–0.9)
LDH SERPL L TO P-CCNC: 196 U/L (ref 107–266)
LYMPHOCYTES # BLD AUTO: 1.45 K/UL (ref 1–4.8)
LYMPHOCYTES NFR BLD: 23.6 % (ref 22–41)
MCH RBC QN AUTO: 35.6 PG (ref 27–33)
MCHC RBC AUTO-ENTMCNC: 33.6 G/DL (ref 33.6–35)
MCV RBC AUTO: 106.1 FL (ref 81.4–97.8)
MONOCYTES # BLD AUTO: 1.1 K/UL (ref 0–0.85)
MONOCYTES NFR BLD AUTO: 17.9 % (ref 0–13.4)
NEUTROPHILS # BLD AUTO: 3.16 K/UL (ref 2–7.15)
NEUTROPHILS NFR BLD: 51.5 % (ref 44–72)
NRBC # BLD AUTO: 0 K/UL
NRBC BLD-RTO: 0 /100 WBC
PLATELET # BLD AUTO: 156 K/UL (ref 164–446)
PMV BLD AUTO: 10.9 FL (ref 9–12.9)
POTASSIUM SERPL-SCNC: 4.2 MMOL/L (ref 3.6–5.5)
PROT SERPL-MCNC: 6.5 G/DL (ref 6–8.2)
RBC # BLD AUTO: 3.93 M/UL (ref 4.2–5.4)
SODIUM SERPL-SCNC: 135 MMOL/L (ref 135–145)
WBC # BLD AUTO: 6.1 K/UL (ref 4.8–10.8)

## 2023-05-02 PROCEDURE — 36415 COLL VENOUS BLD VENIPUNCTURE: CPT

## 2023-05-02 PROCEDURE — 85025 COMPLETE CBC W/AUTO DIFF WBC: CPT

## 2023-05-02 PROCEDURE — 80053 COMPREHEN METABOLIC PANEL: CPT

## 2023-05-02 PROCEDURE — 83615 LACTATE (LD) (LDH) ENZYME: CPT

## 2023-05-10 ENCOUNTER — HOSPITAL ENCOUNTER (OUTPATIENT)
Facility: MEDICAL CENTER | Age: 78
End: 2023-05-10
Attending: INTERNAL MEDICINE
Payer: MEDICARE

## 2023-05-10 LAB
ALBUMIN SERPL BCP-MCNC: 4.6 G/DL (ref 3.2–4.9)
ALBUMIN/GLOB SERPL: 2.2 G/DL
ALP SERPL-CCNC: 76 U/L (ref 30–99)
ALT SERPL-CCNC: 22 U/L (ref 2–50)
ANION GAP SERPL CALC-SCNC: 11 MMOL/L (ref 7–16)
AST SERPL-CCNC: 17 U/L (ref 12–45)
BILIRUB SERPL-MCNC: 0.5 MG/DL (ref 0.1–1.5)
BUN SERPL-MCNC: 15 MG/DL (ref 8–22)
CALCIUM ALBUM COR SERPL-MCNC: 9.1 MG/DL (ref 8.5–10.5)
CALCIUM SERPL-MCNC: 9.6 MG/DL (ref 8.5–10.5)
CHLORIDE SERPL-SCNC: 102 MMOL/L (ref 96–112)
CO2 SERPL-SCNC: 25 MMOL/L (ref 20–33)
CREAT SERPL-MCNC: 0.62 MG/DL (ref 0.5–1.4)
GFR SERPLBLD CREATININE-BSD FMLA CKD-EPI: 90 ML/MIN/1.73 M 2
GLOBULIN SER CALC-MCNC: 2.1 G/DL (ref 1.9–3.5)
GLUCOSE SERPL-MCNC: 110 MG/DL (ref 65–99)
LDH SERPL L TO P-CCNC: 198 U/L (ref 107–266)
POTASSIUM SERPL-SCNC: 4.8 MMOL/L (ref 3.6–5.5)
PROT SERPL-MCNC: 6.7 G/DL (ref 6–8.2)
SODIUM SERPL-SCNC: 138 MMOL/L (ref 135–145)

## 2023-05-10 PROCEDURE — 83615 LACTATE (LD) (LDH) ENZYME: CPT

## 2023-05-10 PROCEDURE — 80053 COMPREHEN METABOLIC PANEL: CPT

## 2023-11-15 ENCOUNTER — HOSPITAL ENCOUNTER (OUTPATIENT)
Dept: LAB | Facility: MEDICAL CENTER | Age: 78
End: 2023-11-15
Attending: INTERNAL MEDICINE
Payer: MEDICARE

## 2023-11-15 LAB
ALBUMIN SERPL BCP-MCNC: 4.7 G/DL (ref 3.2–4.9)
ALBUMIN/GLOB SERPL: 2.2 G/DL
ALP SERPL-CCNC: 67 U/L (ref 30–99)
ALT SERPL-CCNC: 26 U/L (ref 2–50)
ANION GAP SERPL CALC-SCNC: 10 MMOL/L (ref 7–16)
AST SERPL-CCNC: 22 U/L (ref 12–45)
BASOPHILS # BLD AUTO: 1.2 % (ref 0–1.8)
BASOPHILS # BLD: 0.07 K/UL (ref 0–0.12)
BILIRUB SERPL-MCNC: 0.5 MG/DL (ref 0.1–1.5)
BUN SERPL-MCNC: 15 MG/DL (ref 8–22)
CALCIUM ALBUM COR SERPL-MCNC: 8.8 MG/DL (ref 8.5–10.5)
CALCIUM SERPL-MCNC: 9.4 MG/DL (ref 8.5–10.5)
CHLORIDE SERPL-SCNC: 100 MMOL/L (ref 96–112)
CO2 SERPL-SCNC: 26 MMOL/L (ref 20–33)
CREAT SERPL-MCNC: 0.59 MG/DL (ref 0.5–1.4)
EOSINOPHIL # BLD AUTO: 0.22 K/UL (ref 0–0.51)
EOSINOPHIL NFR BLD: 3.7 % (ref 0–6.9)
ERYTHROCYTE [DISTWIDTH] IN BLOOD BY AUTOMATED COUNT: 57 FL (ref 35.9–50)
GFR SERPLBLD CREATININE-BSD FMLA CKD-EPI: 91 ML/MIN/1.73 M 2
GLOBULIN SER CALC-MCNC: 2.1 G/DL (ref 1.9–3.5)
GLUCOSE SERPL-MCNC: 100 MG/DL (ref 65–99)
HCT VFR BLD AUTO: 42.8 % (ref 37–47)
HGB BLD-MCNC: 14.4 G/DL (ref 12–16)
IMM GRANULOCYTES # BLD AUTO: 0.07 K/UL (ref 0–0.11)
IMM GRANULOCYTES NFR BLD AUTO: 1.2 % (ref 0–0.9)
LDH SERPL L TO P-CCNC: 216 U/L (ref 107–266)
LYMPHOCYTES # BLD AUTO: 1.37 K/UL (ref 1–4.8)
LYMPHOCYTES NFR BLD: 22.9 % (ref 22–41)
MCH RBC QN AUTO: 36.5 PG (ref 27–33)
MCHC RBC AUTO-ENTMCNC: 33.6 G/DL (ref 32.2–35.5)
MCV RBC AUTO: 108.6 FL (ref 81.4–97.8)
MONOCYTES # BLD AUTO: 0.99 K/UL (ref 0–0.85)
MONOCYTES NFR BLD AUTO: 16.6 % (ref 0–13.4)
NEUTROPHILS # BLD AUTO: 3.25 K/UL (ref 1.82–7.42)
NEUTROPHILS NFR BLD: 54.4 % (ref 44–72)
NRBC # BLD AUTO: 0 K/UL
NRBC BLD-RTO: 0 /100 WBC (ref 0–0.2)
PLATELET # BLD AUTO: 179 K/UL (ref 164–446)
PMV BLD AUTO: 10.7 FL (ref 9–12.9)
POTASSIUM SERPL-SCNC: 4.2 MMOL/L (ref 3.6–5.5)
PROT SERPL-MCNC: 6.8 G/DL (ref 6–8.2)
RBC # BLD AUTO: 3.94 M/UL (ref 4.2–5.4)
SODIUM SERPL-SCNC: 136 MMOL/L (ref 135–145)
WBC # BLD AUTO: 6 K/UL (ref 4.8–10.8)

## 2023-11-15 PROCEDURE — 36415 COLL VENOUS BLD VENIPUNCTURE: CPT

## 2023-11-15 PROCEDURE — 85025 COMPLETE CBC W/AUTO DIFF WBC: CPT

## 2023-11-15 PROCEDURE — 83615 LACTATE (LD) (LDH) ENZYME: CPT

## 2023-11-15 PROCEDURE — 80053 COMPREHEN METABOLIC PANEL: CPT

## 2023-11-29 ENCOUNTER — PATIENT MESSAGE (OUTPATIENT)
Dept: HEALTH INFORMATION MANAGEMENT | Facility: OTHER | Age: 78
End: 2023-11-29

## 2023-12-28 ENCOUNTER — HOSPITAL ENCOUNTER (OUTPATIENT)
Dept: LAB | Facility: MEDICAL CENTER | Age: 78
End: 2023-12-28
Attending: INTERNAL MEDICINE
Payer: MEDICARE

## 2023-12-28 LAB — TSH SERPL DL<=0.005 MIU/L-ACNC: 1.49 UIU/ML (ref 0.38–5.33)

## 2023-12-28 PROCEDURE — 84443 ASSAY THYROID STIM HORMONE: CPT

## 2023-12-28 PROCEDURE — 36415 COLL VENOUS BLD VENIPUNCTURE: CPT

## 2024-04-22 ENCOUNTER — HOSPITAL ENCOUNTER (OUTPATIENT)
Dept: RADIOLOGY | Facility: MEDICAL CENTER | Age: 79
End: 2024-04-22
Attending: INTERNAL MEDICINE
Payer: MEDICARE

## 2024-04-22 DIAGNOSIS — Z12.31 VISIT FOR SCREENING MAMMOGRAM: ICD-10-CM

## 2024-05-08 ENCOUNTER — HOSPITAL ENCOUNTER (OUTPATIENT)
Dept: RADIOLOGY | Facility: MEDICAL CENTER | Age: 79
End: 2024-05-08
Attending: INTERNAL MEDICINE
Payer: MEDICARE

## 2024-05-15 ENCOUNTER — HOSPITAL ENCOUNTER (OUTPATIENT)
Dept: LAB | Facility: MEDICAL CENTER | Age: 79
End: 2024-05-15
Attending: INTERNAL MEDICINE
Payer: MEDICARE

## 2024-05-15 LAB
ALBUMIN SERPL BCP-MCNC: 4.7 G/DL (ref 3.2–4.9)
ALBUMIN/GLOB SERPL: 3.1 G/DL
ALP SERPL-CCNC: 65 U/L (ref 30–99)
ALT SERPL-CCNC: 21 U/L (ref 2–50)
ANION GAP SERPL CALC-SCNC: 12 MMOL/L (ref 7–16)
AST SERPL-CCNC: 20 U/L (ref 12–45)
BASOPHILS # BLD AUTO: 0.6 % (ref 0–1.8)
BASOPHILS # BLD: 0.04 K/UL (ref 0–0.12)
BILIRUB SERPL-MCNC: 0.4 MG/DL (ref 0.1–1.5)
BUN SERPL-MCNC: 11 MG/DL (ref 8–22)
CALCIUM ALBUM COR SERPL-MCNC: 8.7 MG/DL (ref 8.5–10.5)
CALCIUM SERPL-MCNC: 9.3 MG/DL (ref 8.5–10.5)
CHLORIDE SERPL-SCNC: 99 MMOL/L (ref 96–112)
CO2 SERPL-SCNC: 24 MMOL/L (ref 20–33)
CREAT SERPL-MCNC: 0.62 MG/DL (ref 0.5–1.4)
EOSINOPHIL # BLD AUTO: 0.29 K/UL (ref 0–0.51)
EOSINOPHIL NFR BLD: 4.5 % (ref 0–6.9)
ERYTHROCYTE [DISTWIDTH] IN BLOOD BY AUTOMATED COUNT: 58.4 FL (ref 35.9–50)
GFR SERPLBLD CREATININE-BSD FMLA CKD-EPI: 89 ML/MIN/1.73 M 2
GLOBULIN SER CALC-MCNC: 1.5 G/DL (ref 1.9–3.5)
GLUCOSE SERPL-MCNC: 87 MG/DL (ref 65–99)
HCT VFR BLD AUTO: 41.5 % (ref 37–47)
HGB BLD-MCNC: 13.8 G/DL (ref 12–16)
IMM GRANULOCYTES # BLD AUTO: 0.06 K/UL (ref 0–0.11)
IMM GRANULOCYTES NFR BLD AUTO: 0.9 % (ref 0–0.9)
LDH SERPL L TO P-CCNC: 239 U/L (ref 107–266)
LYMPHOCYTES # BLD AUTO: 1.5 K/UL (ref 1–4.8)
LYMPHOCYTES NFR BLD: 23.2 % (ref 22–41)
MCH RBC QN AUTO: 35.9 PG (ref 27–33)
MCHC RBC AUTO-ENTMCNC: 33.3 G/DL (ref 32.2–35.5)
MCV RBC AUTO: 108.1 FL (ref 81.4–97.8)
MONOCYTES # BLD AUTO: 1.28 K/UL (ref 0–0.85)
MONOCYTES NFR BLD AUTO: 19.8 % (ref 0–13.4)
NEUTROPHILS # BLD AUTO: 3.29 K/UL (ref 1.82–7.42)
NEUTROPHILS NFR BLD: 51 % (ref 44–72)
NRBC # BLD AUTO: 0 K/UL
NRBC BLD-RTO: 0 /100 WBC (ref 0–0.2)
PLATELET # BLD AUTO: 153 K/UL (ref 164–446)
PMV BLD AUTO: 10.9 FL (ref 9–12.9)
POTASSIUM SERPL-SCNC: 4.7 MMOL/L (ref 3.6–5.5)
PROT SERPL-MCNC: 6.2 G/DL (ref 6–8.2)
RBC # BLD AUTO: 3.84 M/UL (ref 4.2–5.4)
SODIUM SERPL-SCNC: 135 MMOL/L (ref 135–145)
WBC # BLD AUTO: 6.5 K/UL (ref 4.8–10.8)

## 2024-09-19 ENCOUNTER — APPOINTMENT (OUTPATIENT)
Dept: RADIOLOGY | Facility: MEDICAL CENTER | Age: 79
End: 2024-09-19
Attending: EMERGENCY MEDICINE
Payer: MEDICARE

## 2024-09-19 ENCOUNTER — PHARMACY VISIT (OUTPATIENT)
Dept: PHARMACY | Facility: MEDICAL CENTER | Age: 79
End: 2024-09-19
Payer: COMMERCIAL

## 2024-09-19 ENCOUNTER — HOSPITAL ENCOUNTER (EMERGENCY)
Facility: MEDICAL CENTER | Age: 79
End: 2024-09-19
Attending: EMERGENCY MEDICINE
Payer: MEDICARE

## 2024-09-19 VITALS
TEMPERATURE: 97.8 F | DIASTOLIC BLOOD PRESSURE: 86 MMHG | HEART RATE: 85 BPM | BODY MASS INDEX: 23.32 KG/M2 | RESPIRATION RATE: 17 BRPM | WEIGHT: 140 LBS | HEIGHT: 65 IN | SYSTOLIC BLOOD PRESSURE: 183 MMHG | OXYGEN SATURATION: 93 %
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
## 2024-09-19 DIAGNOSIS — S42.291A CLOSED FRACTURE OF HEAD OF RIGHT HUMERUS, INITIAL ENCOUNTER: ICD-10-CM

## 2024-09-19 PROCEDURE — 99285 EMERGENCY DEPT VISIT HI MDM: CPT

## 2024-09-19 PROCEDURE — 96376 TX/PRO/DX INJ SAME DRUG ADON: CPT

## 2024-09-19 PROCEDURE — RXMED WILLOW AMBULATORY MEDICATION CHARGE: Performed by: EMERGENCY MEDICINE

## 2024-09-19 PROCEDURE — 700111 HCHG RX REV CODE 636 W/ 250 OVERRIDE (IP): Mod: JZ | Performed by: EMERGENCY MEDICINE

## 2024-09-19 PROCEDURE — 73060 X-RAY EXAM OF HUMERUS: CPT | Mod: RT

## 2024-09-19 PROCEDURE — 96374 THER/PROPH/DIAG INJ IV PUSH: CPT

## 2024-09-19 PROCEDURE — 96375 TX/PRO/DX INJ NEW DRUG ADDON: CPT

## 2024-09-19 PROCEDURE — 73030 X-RAY EXAM OF SHOULDER: CPT | Mod: RT

## 2024-09-19 RX ORDER — MORPHINE SULFATE 4 MG/ML
4 INJECTION INTRAVENOUS ONCE
Status: COMPLETED | OUTPATIENT
Start: 2024-09-19 | End: 2024-09-19

## 2024-09-19 RX ORDER — OXYCODONE HYDROCHLORIDE 5 MG/1
5 TABLET ORAL EVERY 6 HOURS PRN
Qty: 12 TABLET | Refills: 0 | Status: SHIPPED | OUTPATIENT
Start: 2024-09-19 | End: 2024-09-24

## 2024-09-19 RX ORDER — AMOXICILLIN 250 MG
1 CAPSULE ORAL DAILY
Qty: 7 TABLET | Refills: 0 | Status: SHIPPED | OUTPATIENT
Start: 2024-09-19 | End: 2024-09-26

## 2024-09-19 RX ORDER — ONDANSETRON 2 MG/ML
4 INJECTION INTRAMUSCULAR; INTRAVENOUS ONCE
Status: COMPLETED | OUTPATIENT
Start: 2024-09-19 | End: 2024-09-19

## 2024-09-19 RX ADMIN — ONDANSETRON 4 MG: 2 INJECTION INTRAMUSCULAR; INTRAVENOUS at 19:45

## 2024-09-19 RX ADMIN — MORPHINE SULFATE 4 MG: 4 INJECTION, SOLUTION INTRAMUSCULAR; INTRAVENOUS at 19:45

## 2024-09-19 RX ADMIN — MORPHINE SULFATE 4 MG: 4 INJECTION, SOLUTION INTRAMUSCULAR; INTRAVENOUS at 20:20

## 2024-09-19 ASSESSMENT — FIBROSIS 4 INDEX: FIB4 SCORE: 2.31

## 2024-09-19 ASSESSMENT — PAIN DESCRIPTION - PAIN TYPE
TYPE: ACUTE PAIN
TYPE: ACUTE PAIN

## 2024-09-20 NOTE — ED NOTES
Bedside report received from off going RN: Heather. Assumed care of patient.  POC discussed with patient. Call light within reach, all needs addressed at this time.       Fall risk interventions in place: Move the patient closer to the nurse's station, Patient's personal possessions are with in their safe reach, Place socks on patient, Place fall risk sign on patient's door, Keep floor surfaces clean and dry, and Accompanied to restroom   Continuous monitoring: Pulse Ox or Blood Pressure  IVF/IV medications: Not Applicable   Oxygen: Room Air  Bedside sitter: Not Applicable   Isolation: Not Applicable

## 2024-09-20 NOTE — ED TRIAGE NOTES
Chief Complaint   Patient presents with    Fall     Fall today after tripping over the dog. Patient reports right shoulder pain to elbow. - head strike      Patient given 25 mcg Fentanyl by EMS

## 2024-09-20 NOTE — ED PROVIDER NOTES
"ED Provider Note    CHIEF COMPLAINT  Chief Complaint   Patient presents with    Fall     Fall today after tripping over the dog. Patient reports right shoulder pain to elbow. - head strike        EXTERNAL RECORDS REVIEWED  Other No pertinent records to review.    HPI/ROS  LIMITATION TO HISTORY   Select: : None  OUTSIDE HISTORIAN(S):  None    Puja Briscoe is a 79 y.o. female who presents to the emergency department with right shoulder pain.  Patient was walking when her dog crossed her path.  She tripped and landed on her right shoulder.  No head trauma or loss of consciousness.  She does not take a blood thinning medication.  No neck or back pain.  She reports normal sensation in her right upper extremity.  No wrist or elbow pain.    PAST MEDICAL HISTORY   has a past medical history of biopsy (1997).    SURGICAL HISTORY   has a past surgical history that includes hysterectomy laparoscopy; orif, knee; us-needle core bx-breast panel; bone marrow biopsy, ndl/trocar (12/22/2017); and bone marrow aspiration (12/22/2017).    FAMILY HISTORY  History reviewed. No pertinent family history.    SOCIAL HISTORY  Social History     Tobacco Use    Smoking status: Never    Smokeless tobacco: Never   Vaping Use    Vaping status: Never Used   Substance and Sexual Activity    Alcohol use: Yes     Comment: Socially, last drink x1 month ago    Drug use: No    Sexual activity: Not on file       CURRENT MEDICATIONS  Home Medications       Reviewed by Heather Chance R.N. (Registered Nurse) on 09/19/24 at 1825  Med List Status: Partial     Medication Last Dose Status   ascorbic acid (ASCORBIC ACID) 500 MG Tab  Active   Calcium Carbonate-Vit D-Min (CALCIUM 1200 PO)  Active   multivitamin (THERAGRAN) Tab  Active                    ALLERGIES  No Known Allergies    PHYSICAL EXAM  VITAL SIGNS: BP (!) 183/86   Pulse 85   Temp 36.6 °C (97.8 °F) (Temporal)   Resp 17   Ht 1.651 m (5' 5\")   Wt 63.5 kg (140 lb)   SpO2 93%   BMI 23.30 kg/m² "    General: Well-appearing, no acute distress.   Eyes: EOM grossly intact BL.  HENT: No facial or head trauma.  Neck: Normal ROM. No midline cervical spine tenderness.   Lungs: Non-labored breathing. Clear to auscultation bilaterally. No wheezing or crackles.  Cardiac: Regular rate and rhythm. No murmurs. No lower extremity swelling. Equal and symmetric distal pulses. Well-perfused.  Abdomen: Soft, non-tender, non-distended. No rebound or guarding.   MSK: Right upper extremity: 2+ radial pulse.  Sensation intact.  No tenderness to palpation involving the right wrist or right elbow.  No clavicular deformity.  Tenderness and some swelling over the right shoulder.  Skin: No rashes, lesions, bruising, or petechiae. Well-perfused.   Neuro: Grossly nonfocal neurologic exam. Face symmetric. Normal mentation.     EKG/LABS  None.    RADIOLOGY/PROCEDURES   I have independently interpreted the diagnostic imaging associated with this visit and am waiting the final reading from the radiologist.   My preliminary interpretation is as follows:   Shoulder x-ray: Humeral head fracture.    Radiologist interpretation:  DX-SHOULDER 2+ RIGHT   Final Result      1.  Acute comminuted fracture of the right humeral head extending into the surgical neck of the humerus in near anatomic alignment      DX-HUMERUS 2+ RIGHT   Final Result         1.  Acute fractures of the right humeral head and extending into the adjacent surgical neck.          COURSE & MEDICAL DECISION MAKING    ASSESSMENT, COURSE AND PLAN  Care Narrative:   Puja is an 79-year-old female who presents to the emergency department with right shoulder pain after mechanical ground-level fall.  On my initial assessment, ABCs are intact.  She is hypertensive, but otherwise vital signs are within normal limits.  She has no facial or head trauma.  No cervical, thoracic, lumbar midline tenderness.  She has tenderness over her right shoulder with some swelling, but no gross deformity.   She is neurovascularly intact.  No pain over the right wrist or right elbow.  She received fentanyl with EMS.    I ordered morphine and Zofran.  X-ray showed an acute comminuted fracture of the humeral head extending into the surgical neck of the humerus in near anatomic alignment.    20:50: I reassessed the patient.  She was placed in a sling for immobilization.  I reviewed the x-rays with the patient.  I explained that most humeral head fractures are nonoperative, however she will still need to follow-up with an orthopedic surgeon in clinic.  I placed a orthopedic surgery referral.  I provided patient with the clinic information and phone number to call the clinic and schedule follow-up appointment as soon as possible.  I recommended using the sling at all times for immobilization.  I recommended Tylenol and ibuprofen first-line for pain control.  I prescribed oxycodone for severe breakthrough pain, as well as a stool softener.  I reviewed strict return precautions, all of her questions were answered, and she was discharged in stable condition.    ADDITIONAL PROBLEMS MANAGED  N/A    DISPOSITION AND DISCUSSIONS  I have discussed management of the patient with the following physicians and CARSON's: None    Discussion of management with other Q[facility] or appropriate source(s): None     Escalation of care considered, and ultimately not performed:acute inpatient care management, however at this time, the patient is most appropriate for outpatient management    Barriers to care at this time, including but not limited to: None.     Decision tools and prescription drugs considered including, but not limited to: Pain Medications oxycodone .    FINAL DIAGNOSIS  1. Closed fracture of head of right humerus, initial encounter Acute   2. Ground-level fall Acute        Electronically signed by: Beverly Swift D.O., 9/19/2024 7:16 PM

## 2024-09-20 NOTE — DISCHARGE INSTRUCTIONS
You have a fracture in your shoulder, specifically the humeral head, which is the proximal part of the long bone in your upper arm.  You will need to follow-up with orthopedic surgeon clinic.  You will need to wear sling at all times to immobilize the joint.  You can use Tylenol and ibuprofen together the same time every 6 hours first-line for pain control.  You can use oxycodone every 6 hours for severe breakthrough pain.  Oxycodone is a narcotic pain medication, which can be sedating, so if you take oxycodone, you should not drive a car.  Oxycodone can also be constipating, so if you start taking oxycodone you should also take the stool softener that I prescribed called docusate senna, 1 tablet daily.    Please call the orthopedic clinic tomorrow to arrange a follow-up appointment.  The on-call orthopedist this evening is Dr. Aldana, and he works at [facility].  If you would prefer to go to [facility], you can also call tomorrow to see if they can get you in for a follow-up appointment.

## 2024-11-12 ENCOUNTER — HOSPITAL ENCOUNTER (OUTPATIENT)
Dept: LAB | Facility: MEDICAL CENTER | Age: 79
End: 2024-11-12
Attending: INTERNAL MEDICINE
Payer: MEDICARE

## 2024-11-12 LAB
BASOPHILS # BLD AUTO: 1.1 % (ref 0–1.8)
BASOPHILS # BLD: 0.08 K/UL (ref 0–0.12)
EOSINOPHIL # BLD AUTO: 0.28 K/UL (ref 0–0.51)
EOSINOPHIL NFR BLD: 3.7 % (ref 0–6.9)
ERYTHROCYTE [DISTWIDTH] IN BLOOD BY AUTOMATED COUNT: 54.4 FL (ref 35.9–50)
HCT VFR BLD AUTO: 43.5 % (ref 37–47)
HGB BLD-MCNC: 14.4 G/DL (ref 12–16)
IMM GRANULOCYTES # BLD AUTO: 0.12 K/UL (ref 0–0.11)
IMM GRANULOCYTES NFR BLD AUTO: 1.6 % (ref 0–0.9)
LYMPHOCYTES # BLD AUTO: 1.96 K/UL (ref 1–4.8)
LYMPHOCYTES NFR BLD: 26.1 % (ref 22–41)
MCH RBC QN AUTO: 34.6 PG (ref 27–33)
MCHC RBC AUTO-ENTMCNC: 33.1 G/DL (ref 32.2–35.5)
MCV RBC AUTO: 104.6 FL (ref 81.4–97.8)
MONOCYTES # BLD AUTO: 1.44 K/UL (ref 0–0.85)
MONOCYTES NFR BLD AUTO: 19.2 % (ref 0–13.4)
NEUTROPHILS # BLD AUTO: 3.62 K/UL (ref 1.82–7.42)
NEUTROPHILS NFR BLD: 48.3 % (ref 44–72)
NRBC # BLD AUTO: 0 K/UL
NRBC BLD-RTO: 0 /100 WBC (ref 0–0.2)
PLATELET # BLD AUTO: 179 K/UL (ref 164–446)
PMV BLD AUTO: 10.7 FL (ref 9–12.9)
RBC # BLD AUTO: 4.16 M/UL (ref 4.2–5.4)
WBC # BLD AUTO: 7.5 K/UL (ref 4.8–10.8)

## 2024-11-12 PROCEDURE — 85025 COMPLETE CBC W/AUTO DIFF WBC: CPT

## 2024-11-12 PROCEDURE — 36415 COLL VENOUS BLD VENIPUNCTURE: CPT

## 2024-11-12 PROCEDURE — 80053 COMPREHEN METABOLIC PANEL: CPT

## 2024-11-12 PROCEDURE — 83615 LACTATE (LD) (LDH) ENZYME: CPT

## 2024-11-13 LAB
ALBUMIN SERPL BCP-MCNC: 4.7 G/DL (ref 3.2–4.9)
ALBUMIN/GLOB SERPL: 2 G/DL
ALP SERPL-CCNC: 83 U/L (ref 30–99)
ALT SERPL-CCNC: 28 U/L (ref 2–50)
ANION GAP SERPL CALC-SCNC: 15 MMOL/L (ref 7–16)
AST SERPL-CCNC: 22 U/L (ref 12–45)
BILIRUB SERPL-MCNC: 0.3 MG/DL (ref 0.1–1.5)
BUN SERPL-MCNC: 14 MG/DL (ref 8–22)
CALCIUM ALBUM COR SERPL-MCNC: 9.9 MG/DL (ref 8.5–10.5)
CALCIUM SERPL-MCNC: 10.5 MG/DL (ref 8.5–10.5)
CHLORIDE SERPL-SCNC: 99 MMOL/L (ref 96–112)
CO2 SERPL-SCNC: 20 MMOL/L (ref 20–33)
CREAT SERPL-MCNC: 0.87 MG/DL (ref 0.5–1.4)
GFR SERPLBLD CREATININE-BSD FMLA CKD-EPI: 67 ML/MIN/1.73 M 2
GLOBULIN SER CALC-MCNC: 2.4 G/DL (ref 1.9–3.5)
GLUCOSE SERPL-MCNC: 98 MG/DL (ref 65–99)
LDH SERPL L TO P-CCNC: 245 U/L (ref 107–266)
POTASSIUM SERPL-SCNC: 4.2 MMOL/L (ref 3.6–5.5)
PROT SERPL-MCNC: 7.1 G/DL (ref 6–8.2)
SODIUM SERPL-SCNC: 134 MMOL/L (ref 135–145)

## 2025-05-13 ENCOUNTER — HOSPITAL ENCOUNTER (OUTPATIENT)
Dept: LAB | Facility: MEDICAL CENTER | Age: 80
End: 2025-05-13
Attending: INTERNAL MEDICINE
Payer: MEDICARE

## 2025-05-13 LAB
ALBUMIN SERPL BCP-MCNC: 4.5 G/DL (ref 3.2–4.9)
ALBUMIN/GLOB SERPL: 2 G/DL
ALP SERPL-CCNC: 69 U/L (ref 30–99)
ALT SERPL-CCNC: 31 U/L (ref 2–50)
ANION GAP SERPL CALC-SCNC: 12 MMOL/L (ref 7–16)
AST SERPL-CCNC: 26 U/L (ref 12–45)
BASOPHILS # BLD AUTO: 0 % (ref 0–1.8)
BASOPHILS # BLD: 0 K/UL (ref 0–0.12)
BILIRUB SERPL-MCNC: 0.4 MG/DL (ref 0.1–1.5)
BUN SERPL-MCNC: 13 MG/DL (ref 8–22)
CALCIUM ALBUM COR SERPL-MCNC: 9.3 MG/DL (ref 8.5–10.5)
CALCIUM SERPL-MCNC: 9.7 MG/DL (ref 8.5–10.5)
CHLORIDE SERPL-SCNC: 105 MMOL/L (ref 96–112)
CO2 SERPL-SCNC: 21 MMOL/L (ref 20–33)
CREAT SERPL-MCNC: 0.74 MG/DL (ref 0.5–1.4)
EOSINOPHIL # BLD AUTO: 0.4 K/UL (ref 0–0.51)
EOSINOPHIL NFR BLD: 5.1 % (ref 0–6.9)
ERYTHROCYTE [DISTWIDTH] IN BLOOD BY AUTOMATED COUNT: 56.3 FL (ref 35.9–50)
GFR SERPLBLD CREATININE-BSD FMLA CKD-EPI: 81 ML/MIN/1.73 M 2
GLOBULIN SER CALC-MCNC: 2.2 G/DL (ref 1.9–3.5)
GLUCOSE SERPL-MCNC: 80 MG/DL (ref 65–99)
HCT VFR BLD AUTO: 42.7 % (ref 37–47)
HGB BLD-MCNC: 14.6 G/DL (ref 12–16)
LDH SERPL L TO P-CCNC: 256 U/L (ref 107–266)
LYMPHOCYTES # BLD AUTO: 2.09 K/UL (ref 1–4.8)
LYMPHOCYTES NFR BLD: 26.5 % (ref 22–41)
MANUAL DIFF BLD: NORMAL
MCH RBC QN AUTO: 36.5 PG (ref 27–33)
MCHC RBC AUTO-ENTMCNC: 34.2 G/DL (ref 32.2–35.5)
MCV RBC AUTO: 106.8 FL (ref 81.4–97.8)
MONOCYTES # BLD AUTO: 1.5 K/UL (ref 0–0.85)
MONOCYTES NFR BLD AUTO: 18.8 % (ref 0–13.4)
MORPHOLOGY BLD-IMP: NORMAL
MYELOCYTES NFR BLD MANUAL: 0.9 %
NEUTROPHILS # BLD AUTO: 3.85 K/UL (ref 1.82–7.42)
NEUTROPHILS NFR BLD: 48.7 % (ref 44–72)
NRBC # BLD AUTO: 0 K/UL
NRBC BLD-RTO: 0 /100 WBC (ref 0–0.2)
PLATELET # BLD AUTO: 183 K/UL (ref 164–446)
PLATELET BLD QL SMEAR: NORMAL
PMV BLD AUTO: 10.6 FL (ref 9–12.9)
POTASSIUM SERPL-SCNC: 4.2 MMOL/L (ref 3.6–5.5)
PROT SERPL-MCNC: 6.7 G/DL (ref 6–8.2)
RBC # BLD AUTO: 4 M/UL (ref 4.2–5.4)
RBC BLD AUTO: NORMAL
SODIUM SERPL-SCNC: 138 MMOL/L (ref 135–145)
WBC # BLD AUTO: 7.9 K/UL (ref 4.8–10.8)

## 2025-05-13 PROCEDURE — 80053 COMPREHEN METABOLIC PANEL: CPT

## 2025-05-13 PROCEDURE — 36415 COLL VENOUS BLD VENIPUNCTURE: CPT

## 2025-05-13 PROCEDURE — 85027 COMPLETE CBC AUTOMATED: CPT

## 2025-05-13 PROCEDURE — 85007 BL SMEAR W/DIFF WBC COUNT: CPT

## 2025-05-13 PROCEDURE — 83615 LACTATE (LD) (LDH) ENZYME: CPT

## 2025-05-14 ENCOUNTER — HOSPITAL ENCOUNTER (OUTPATIENT)
Dept: RADIOLOGY | Facility: MEDICAL CENTER | Age: 80
End: 2025-05-14
Attending: INTERNAL MEDICINE
Payer: MEDICARE

## 2025-05-14 DIAGNOSIS — Z12.31 VISIT FOR SCREENING MAMMOGRAM: ICD-10-CM

## 2025-05-14 PROCEDURE — 77067 SCR MAMMO BI INCL CAD: CPT

## (undated) DEVICE — CON SEDATION/>5 YR 1ST 15 MIN

## (undated) DEVICE — SYRINGE 6 CC 20 GA X 1 1/2 - NDL SAFETY  (50/BX)

## (undated) DEVICE — CANNULA W/ SUPPLY TUBING O2 - (50/CA)

## (undated) DEVICE — BANDAID SHEER STRIP 3/4 IN (100EA/BX 12BX/CA)

## (undated) DEVICE — SOD. CHL 10CC SYRINGE PREFILL - W/10 CC (30/BX)

## (undated) DEVICE — ELECTRODE 850 FOAM ADHESIVE - HYDROGEL RADIOTRNSPRNT (50/PK)

## (undated) DEVICE — SYRINGE 3 CC 22 GA X 1-1/2 - NDL SAFETY (50/BX 8BX/CA)